# Patient Record
Sex: FEMALE | Race: BLACK OR AFRICAN AMERICAN | NOT HISPANIC OR LATINO | URBAN - METROPOLITAN AREA
[De-identification: names, ages, dates, MRNs, and addresses within clinical notes are randomized per-mention and may not be internally consistent; named-entity substitution may affect disease eponyms.]

---

## 2018-04-13 ENCOUNTER — EMERGENCY (EMERGENCY)
Facility: HOSPITAL | Age: 23
LOS: 0 days | Discharge: HOME | End: 2018-04-13
Attending: EMERGENCY MEDICINE
Payer: COMMERCIAL

## 2018-04-13 VITALS
WEIGHT: 250 LBS | RESPIRATION RATE: 16 BRPM | HEIGHT: 64 IN | DIASTOLIC BLOOD PRESSURE: 70 MMHG | OXYGEN SATURATION: 96 % | HEART RATE: 91 BPM | TEMPERATURE: 99 F | SYSTOLIC BLOOD PRESSURE: 135 MMHG

## 2018-04-13 DIAGNOSIS — M79.671 PAIN IN RIGHT FOOT: ICD-10-CM

## 2018-04-13 DIAGNOSIS — M79.89 OTHER SPECIFIED SOFT TISSUE DISORDERS: ICD-10-CM

## 2018-04-13 PROCEDURE — 73630 X-RAY EXAM OF FOOT: CPT | Mod: 26,RT

## 2018-04-13 PROCEDURE — 99283 EMERGENCY DEPT VISIT LOW MDM: CPT

## 2018-04-13 RX ORDER — IBUPROFEN 200 MG
600 TABLET ORAL ONCE
Qty: 0 | Refills: 0 | Status: DISCONTINUED | OUTPATIENT
Start: 2018-04-13 | End: 2018-04-13

## 2018-04-13 RX ORDER — ACETAMINOPHEN 500 MG
650 TABLET ORAL ONCE
Qty: 0 | Refills: 0 | Status: COMPLETED | OUTPATIENT
Start: 2018-04-13 | End: 2018-04-13

## 2018-04-13 RX ADMIN — Medication 650 MILLIGRAM(S): at 11:59

## 2018-04-13 RX ADMIN — Medication 650 MILLIGRAM(S): at 11:33

## 2018-04-13 NOTE — ED PROVIDER NOTE - PHYSICAL EXAMINATION
Gen: No acute distress, non toxic  Neuro: A&O x 3, moving all 4 extremities  MSK: No spine ttp. ttp genearlized dorsal right foot. ?very slight swelling if any. no calf/ankle swelling/pain.   Skin: No rashes. intact and perfused. No erythema.

## 2018-04-13 NOTE — ED ADULT NURSE NOTE - OBJECTIVE STATEMENT
24 yo female c/o R foot pain x 1.5 weeks and swelling x yesterday. pt ambulated into fast track c no assistance needed, and a steady normal gait. pt took ibuprofen pta.

## 2018-04-13 NOTE — ED PROVIDER NOTE - OBJECTIVE STATEMENT
24 y/o female no pmh c/o atraumatic right dorsal foot pain for ~1 week, with mild swelling over past 2 days. Does not remember any trauma/intox, no joint pains in past, no fever/chills, nausea, uri symptoms. Ambulating but with some dorsal pain when ambulating. No ankle/calf pain. Does not smoke, no hx dvt/pe.   States is not pregnant, taking ibuprofen at home.    ROS: No fever/chills. No eye pain/changes in vision, No ear pain/sore throat/dysphagia, No chest pain/palpitations. No SOB/cough/. No abdominal pain, N/V/D, no black/bloody bm. No dysuria/frequency/discharge, No headache. No Dizziness.    No rashes or breaks in skin. No numbness/tingling/weakness. 22 y/o female no pmh c/o atraumatic right dorsal foot pain for ~1 week, with mild swelling over past 2 days. Does not remember any trauma/intox, no joint pains in past, no fever/chills, nausea, uri symptoms. Ambulating but with some dorsal pain when ambulating. No ankle/calf pain. Does not smoke, no hx dvt/pe.   States is not pregnant, takes ocp's. taking ibuprofen at home.    ROS: No fever/chills. No eye pain/changes in vision, No ear pain/sore throat/dysphagia, No chest pain/palpitations. No SOB/cough/. No abdominal pain, N/V/D, no black/bloody bm. No dysuria/frequency/discharge, No headache. No Dizziness.    No rashes or breaks in skin. No numbness/tingling/weakness.

## 2018-04-13 NOTE — ED PROVIDER NOTE - MEDICAL DECISION MAKING DETAILS
?atraumatic dorsal right foot pain, ambulatory, no significant external abnormalities. xray r/o fracture. ice, supportive care, hard sole shoe, f/u podiatry. no sign septic, dvt (given only in foot), plantar fascitis given dorsal. ?atraumatic dorsal right foot pain, ambulatory, no significant external abnormalities. xray r/o fracture. ice, supportive care, hard sole shoe, f/u podiatry. no sign septic, dvt (given only in foot), gout, plantar fascitis given dorsal. possible microtrauma/sprain.

## 2019-08-30 ENCOUNTER — EMERGENCY (EMERGENCY)
Facility: HOSPITAL | Age: 24
LOS: 1 days | Discharge: HOME | End: 2019-08-30
Admitting: EMERGENCY MEDICINE
Payer: COMMERCIAL

## 2019-08-30 VITALS
RESPIRATION RATE: 16 BRPM | HEART RATE: 70 BPM | SYSTOLIC BLOOD PRESSURE: 144 MMHG | DIASTOLIC BLOOD PRESSURE: 70 MMHG | TEMPERATURE: 98 F | OXYGEN SATURATION: 100 %

## 2019-08-30 VITALS
SYSTOLIC BLOOD PRESSURE: 137 MMHG | TEMPERATURE: 99 F | RESPIRATION RATE: 18 BRPM | HEART RATE: 77 BPM | DIASTOLIC BLOOD PRESSURE: 79 MMHG | OXYGEN SATURATION: 100 %

## 2019-08-30 DIAGNOSIS — N84.1 POLYP OF CERVIX UTERI: ICD-10-CM

## 2019-08-30 LAB
ALBUMIN SERPL ELPH-MCNC: 4.3 G/DL — SIGNIFICANT CHANGE UP (ref 3.3–5)
ALP SERPL-CCNC: 88 U/L — SIGNIFICANT CHANGE UP (ref 40–120)
ALT FLD-CCNC: 34 U/L — HIGH (ref 4–33)
ANION GAP SERPL CALC-SCNC: 15 MMO/L — HIGH (ref 7–14)
AST SERPL-CCNC: 30 U/L — SIGNIFICANT CHANGE UP (ref 4–32)
BASOPHILS # BLD AUTO: 0.04 K/UL — SIGNIFICANT CHANGE UP (ref 0–0.2)
BASOPHILS NFR BLD AUTO: 0.4 % — SIGNIFICANT CHANGE UP (ref 0–2)
BILIRUB SERPL-MCNC: < 0.2 MG/DL — LOW (ref 0.2–1.2)
BUN SERPL-MCNC: 8 MG/DL — SIGNIFICANT CHANGE UP (ref 7–23)
CALCIUM SERPL-MCNC: 9.5 MG/DL — SIGNIFICANT CHANGE UP (ref 8.4–10.5)
CHLORIDE SERPL-SCNC: 103 MMOL/L — SIGNIFICANT CHANGE UP (ref 98–107)
CO2 SERPL-SCNC: 22 MMOL/L — SIGNIFICANT CHANGE UP (ref 22–31)
CREAT SERPL-MCNC: 0.74 MG/DL — SIGNIFICANT CHANGE UP (ref 0.5–1.3)
EOSINOPHIL # BLD AUTO: 0.19 K/UL — SIGNIFICANT CHANGE UP (ref 0–0.5)
EOSINOPHIL NFR BLD AUTO: 1.9 % — SIGNIFICANT CHANGE UP (ref 0–6)
GLUCOSE SERPL-MCNC: 87 MG/DL — SIGNIFICANT CHANGE UP (ref 70–99)
HCG SERPL-ACNC: < 5 MIU/ML — SIGNIFICANT CHANGE UP
HCT VFR BLD CALC: 37.4 % — SIGNIFICANT CHANGE UP (ref 34.5–45)
HGB BLD-MCNC: 11.7 G/DL — SIGNIFICANT CHANGE UP (ref 11.5–15.5)
IMM GRANULOCYTES NFR BLD AUTO: 0.2 % — SIGNIFICANT CHANGE UP (ref 0–1.5)
LYMPHOCYTES # BLD AUTO: 4.18 K/UL — HIGH (ref 1–3.3)
LYMPHOCYTES # BLD AUTO: 41.7 % — SIGNIFICANT CHANGE UP (ref 13–44)
MCHC RBC-ENTMCNC: 29.3 PG — SIGNIFICANT CHANGE UP (ref 27–34)
MCHC RBC-ENTMCNC: 31.3 % — LOW (ref 32–36)
MCV RBC AUTO: 93.5 FL — SIGNIFICANT CHANGE UP (ref 80–100)
MONOCYTES # BLD AUTO: 0.63 K/UL — SIGNIFICANT CHANGE UP (ref 0–0.9)
MONOCYTES NFR BLD AUTO: 6.3 % — SIGNIFICANT CHANGE UP (ref 2–14)
NEUTROPHILS # BLD AUTO: 4.96 K/UL — SIGNIFICANT CHANGE UP (ref 1.8–7.4)
NEUTROPHILS NFR BLD AUTO: 49.5 % — SIGNIFICANT CHANGE UP (ref 43–77)
NRBC # FLD: 0 K/UL — SIGNIFICANT CHANGE UP (ref 0–0)
PLATELET # BLD AUTO: 393 K/UL — SIGNIFICANT CHANGE UP (ref 150–400)
PMV BLD: 10.1 FL — SIGNIFICANT CHANGE UP (ref 7–13)
POTASSIUM SERPL-MCNC: 4.3 MMOL/L — SIGNIFICANT CHANGE UP (ref 3.5–5.3)
POTASSIUM SERPL-SCNC: 4.3 MMOL/L — SIGNIFICANT CHANGE UP (ref 3.5–5.3)
PROT SERPL-MCNC: 7.4 G/DL — SIGNIFICANT CHANGE UP (ref 6–8.3)
RBC # BLD: 4 M/UL — SIGNIFICANT CHANGE UP (ref 3.8–5.2)
RBC # FLD: 12.2 % — SIGNIFICANT CHANGE UP (ref 10.3–14.5)
SODIUM SERPL-SCNC: 140 MMOL/L — SIGNIFICANT CHANGE UP (ref 135–145)
WBC # BLD: 10.02 K/UL — SIGNIFICANT CHANGE UP (ref 3.8–10.5)
WBC # FLD AUTO: 10.02 K/UL — SIGNIFICANT CHANGE UP (ref 3.8–10.5)

## 2019-08-30 PROCEDURE — 99284 EMERGENCY DEPT VISIT MOD MDM: CPT

## 2019-08-30 NOTE — ED ADULT NURSE NOTE - NSIMPLEMENTINTERV_GEN_ALL_ED
Implemented All Universal Safety Interventions:  [place] to call system. Call bell, personal items and telephone within reach. Instruct patient to call for assistance. Room bathroom lighting operational. Non-slip footwear when patient is off stretcher. Physically safe environment: no spills, clutter or unnecessary equipment. Stretcher in lowest position, wheels locked, appropriate side rails in place.

## 2019-08-30 NOTE — ED PROVIDER NOTE - PATIENT PORTAL LINK FT
You can access the FollowMyHealth Patient Portal offered by [facility] by registering at the following website: [email or web address]. By joining [employer]’s FollowMyHealth portal, you will also be able to view your health information using other applications (apps) compatible with our system.

## 2019-08-30 NOTE — ED PROVIDER NOTE - PROGRESS NOTE DETAILS
VIRGINIA Ellis: Spoke with GYN, will see pt in the ED VIRGINIA Ellis: Pt seen by GYN, she has small cervical polyp, pt had already spoke with her GYN  and has an appointment with him on Tuesday 9/03 per GYN. Discussed with pt that her blood levels are normal, she will return with any worsening or concerning symptoms

## 2019-08-30 NOTE — ED ADULT NURSE NOTE - OBJECTIVE STATEMENT
PT received into intake A&Ox4, ambulatory C/O vaginal bleeding x 1 day. Pt states was having intercourse yesterday, after intercourse noticed heavy bleeding with clots. States Bleeding is mild today. Denies CP, SOB, palpitations, weakness/ fatigue. Md evaluated, VS as noted. 20 G IV placed to R AC, labs sent. Call bell within reach, comfort measures provided, family at bedside, will continue to monitor for safety and comfort.

## 2019-08-30 NOTE — ED ADULT TRIAGE NOTE - CHIEF COMPLAINT QUOTE
patient reports vaginal bleeding that is 1.5 weeks after her period ended. reports bleeding is mild with some clots. unsure if she is pregnant

## 2019-08-30 NOTE — CONSULT NOTE ADULT - SUBJECTIVE AND OBJECTIVE BOX
JOSH BUSH  24y  Female [id number]    HPI:  23 y/o G0 presents to ED after bleeding of one day duration. Patient states she was having intercourse yesterday and felt something pop and started bleeding. She only filled up minimal pads. Denies CP/SOB/ fever/chills/dizziness/lightheadedness. Patient is currently sexually active with Boyfriend, does not use protection, says is in a monogamous relationship. Would desire pregnancy.         Name of GYN Physician: Dr. Sandoval    POB:  Denies    Pgyn: Denies fibroids, cysts, endometriosis, STI's, denies. Abnormal pap smears     Home meds: None (Spironolactone, OCPs d/c)    Hospital Meds:   MEDICATIONS  (STANDING):    MEDICATIONS  (PRN):      Allergies    penicillin (Anaphylaxis)    Intolerances        PAST MEDICAL & SURGICAL HISTORY:  No pertinent past medical history  No significant past surgical history      FAMILY HISTORY:  No pertinent family history in first degree relatives      Social History:  Denies smoking use, drug use, alcohol use.     Vital Signs Last 24 Hrs  T(C): 36.9 (30 Aug 2019 21:33), Max: 37.2 (30 Aug 2019 19:08)  T(F): 98.4 (30 Aug 2019 21:33), Max: 99 (30 Aug 2019 19:08)  HR: 70 (30 Aug 2019 21:33) (70 - 77)  BP: 144/70 (30 Aug 2019 21:33) (137/79 - 144/70)  BP(mean): --  RR: 16 (30 Aug 2019 21:33) (16 - 18)  SpO2: 100% (30 Aug 2019 21:33) (100% - 100%)    Physical Exam:   General: sitting comfortably in bed, NAD   CV: RR   Lungs: Unlabored  Abd: Soft, non-tender, non-distended.   :  Mild bleeding on pad.    External labia wnl.  Bimanual exam with no cervical motion tenderness, uterus wnl, adnexa non palpable b/l.  Cervix closed   Speculum Exam: small polyp at 12o clock on cervix   Ext: non-tender b/l, no edema     LABS:                              11.7   10.02 )-----------( 393      ( 30 Aug 2019 21:30 )             37.4     08-30    140  |  103  |  8   ----------------------------<  87  4.3   |  22  |  0.74    Ca    9.5      30 Aug 2019 21:30    TPro  7.4  /  Alb  4.3  /  TBili  < 0.2<L>  /  DBili  x   /  AST  30  /  ALT  34<H>  /  AlkPhos  88  08-30    I&O's Detail

## 2019-08-30 NOTE — ED PROVIDER NOTE - CLINICAL SUMMARY MEDICAL DECISION MAKING FREE TEXT BOX
24yF w/pmhx PCOS p/w vaginal bleeding w/ cervical polyp on exam. Will check cbc/cmp/hcg, GYN consult

## 2019-08-30 NOTE — ED PROVIDER NOTE - PHYSICAL EXAMINATION
: chaperone PA Espitia  +vaginal/cervical polyp with mild active bleeding, non-tender, no cervical bleeding, no CMT, no adnexal tenderness

## 2019-08-30 NOTE — ED PROVIDER NOTE - OBJECTIVE STATEMENT
24yF w/pmhx PCOS (not on OCPs) p/w vaginal bleeding. Pt states her LMP was 8/18 but yesterday during intercourse she developed vaginal bleeding described as "something popped". Pt states the bleeding had subsided but began again today. Pt reports she saw something that looks like a polyp in the vagina and is concerned. Pt denies vaginal or pelvic pain, abd pain, n/v/d, fever/chills, weakness, cp, sob or any other concerns.  Pts GYN , pt scheduled to see them next week

## 2019-08-30 NOTE — CONSULT NOTE ADULT - PROBLEM SELECTOR RECOMMENDATION 9
-Nothing acute to do at this time  -Outpatient follow up with Dr. Sandoval appointment on tuesday    CIRO Quinn PGY2

## 2019-08-30 NOTE — ED PROVIDER NOTE - NSFOLLOWUPINSTRUCTIONS_ED_ALL_ED_FT
Follow up with your GYN on 9/03, you have an appointment scheduled  Follow up with your primary care provider within 1 week  Return to the ER with any worsening or concerning symptoms, increased bleeding, weakness, shortness of breath, vaginal pain or any other concerns.

## 2019-08-30 NOTE — ED PROVIDER NOTE - PMH
No pertinent past medical history No pertinent past medical history    PCOS (polycystic ovarian syndrome)

## 2019-09-02 LAB
C TRACH RRNA SPEC QL NAA+PROBE: SIGNIFICANT CHANGE UP
N GONORRHOEA RRNA SPEC QL NAA+PROBE: SIGNIFICANT CHANGE UP
SPECIMEN SOURCE: SIGNIFICANT CHANGE UP

## 2019-09-13 ENCOUNTER — TRANSCRIPTION ENCOUNTER (OUTPATIENT)
Age: 24
End: 2019-09-13

## 2019-12-03 PROBLEM — E28.2 POLYCYSTIC OVARIAN SYNDROME: Chronic | Status: ACTIVE | Noted: 2019-08-31

## 2019-12-13 ENCOUNTER — APPOINTMENT (OUTPATIENT)
Dept: SURGERY | Facility: CLINIC | Age: 24
End: 2019-12-13
Payer: COMMERCIAL

## 2019-12-13 VITALS
RESPIRATION RATE: 14 BRPM | HEIGHT: 64 IN | WEIGHT: 272 LBS | BODY MASS INDEX: 46.44 KG/M2 | TEMPERATURE: 97.2 F | SYSTOLIC BLOOD PRESSURE: 115 MMHG | HEART RATE: 68 BPM | DIASTOLIC BLOOD PRESSURE: 80 MMHG

## 2019-12-13 DIAGNOSIS — E28.2 POLYCYSTIC OVARIAN SYNDROME: ICD-10-CM

## 2019-12-13 DIAGNOSIS — E78.00 PURE HYPERCHOLESTEROLEMIA, UNSPECIFIED: ICD-10-CM

## 2019-12-13 DIAGNOSIS — M54.9 DORSALGIA, UNSPECIFIED: ICD-10-CM

## 2019-12-13 PROCEDURE — 99205 OFFICE O/P NEW HI 60 MIN: CPT

## 2019-12-13 NOTE — CONSULT LETTER
[Dear  ___] : Dear  [unfilled], [Consult Letter:] : I had the pleasure of evaluating your patient, [unfilled]. [Please see my note below.] : Please see my note below. [Consult Closing:] : Thank you very much for allowing me to participate in the care of this patient.  If you have any questions, please do not hesitate to contact me. [Sincerely,] : Sincerely, [FreeTextEntry3] : Diaz Ramsay MD, FACS, FASMBS\par Chief Division of Minimally Invasive Surgery\par Co-Director of Bariatric Surgery \par [facility]\[place] [address]

## 2019-12-13 NOTE — REASON FOR VISIT
[Morbid Obesity (BMI>40)] : morbid obesity (bmi>40) [FreeTextEntry2] : referred by Dr. Janett Suarez chief complaint morbid obesity

## 2019-12-13 NOTE — ASSESSMENT
[FreeTextEntry1] : 24-year-old female 5 foot 4 272 pounds with a BMI of 46.7 she would like to be considered for a sleeve gastrectomy I believe she would be an excellent candidate she will undergo her usual medical nutritional and psychological workup attempt a preoperative support group meeting and return for a second office visit one toe was completed the risks benefits and expectations were discussed at length with the patient all of her questions were answered

## 2020-01-02 DIAGNOSIS — R16.0 HEPATOMEGALY, NOT ELSEWHERE CLASSIFIED: ICD-10-CM

## 2020-01-10 PROBLEM — R16.0 HEPATOMEGALY: Status: ACTIVE | Noted: 2020-01-10

## 2020-03-02 DIAGNOSIS — E55.9 VITAMIN D DEFICIENCY, UNSPECIFIED: ICD-10-CM

## 2020-03-02 LAB
25(OH)D3 SERPL-MCNC: 9.3 NG/ML
ALBUMIN SERPL ELPH-MCNC: 4.4 G/DL
ALP BLD-CCNC: 88 U/L
ALT SERPL-CCNC: 38 U/L
ANION GAP SERPL CALC-SCNC: 14 MMOL/L
APTT BLD: 38 SEC
AST SERPL-CCNC: 24 U/L
BASOPHILS # BLD AUTO: 0.04 K/UL
BASOPHILS NFR BLD AUTO: 0.6 %
BILIRUB SERPL-MCNC: 0.3 MG/DL
BUN SERPL-MCNC: 8 MG/DL
CALCIUM SERPL-MCNC: 9.3 MG/DL
CHLORIDE SERPL-SCNC: 103 MMOL/L
CO2 SERPL-SCNC: 22 MMOL/L
CREAT SERPL-MCNC: 0.76 MG/DL
EOSINOPHIL # BLD AUTO: 0.11 K/UL
EOSINOPHIL NFR BLD AUTO: 1.8 %
FOLATE SERPL-MCNC: 7.4 NG/ML
GLUCOSE SERPL-MCNC: 88 MG/DL
HCG SERPL QL: NEGATIVE
HCT VFR BLD CALC: 40.8 %
HGB BLD-MCNC: 12.7 G/DL
IMM GRANULOCYTES NFR BLD AUTO: 0.2 %
INR PPP: 1.08 RATIO
IRON SERPL-MCNC: 67 UG/DL
LYMPHOCYTES # BLD AUTO: 2.66 K/UL
LYMPHOCYTES NFR BLD AUTO: 43 %
MAN DIFF?: NORMAL
MCHC RBC-ENTMCNC: 30.2 PG
MCHC RBC-ENTMCNC: 31.1 GM/DL
MCV RBC AUTO: 97.1 FL
MONOCYTES # BLD AUTO: 0.36 K/UL
MONOCYTES NFR BLD AUTO: 5.8 %
NEUTROPHILS # BLD AUTO: 3.01 K/UL
NEUTROPHILS NFR BLD AUTO: 48.6 %
PAPP-A SERPL-ACNC: <1 MIU/ML
PLATELET # BLD AUTO: 385 K/UL
POTASSIUM SERPL-SCNC: 4.2 MMOL/L
PROT SERPL-MCNC: 6.9 G/DL
PT BLD: 12.5 SEC
RBC # BLD: 4.2 M/UL
RBC # FLD: 12.2 %
SODIUM SERPL-SCNC: 140 MMOL/L
VIT B12 SERPL-MCNC: 399 PG/ML
WBC # FLD AUTO: 6.19 K/UL

## 2020-03-04 LAB — VIT B1 SERPL-MCNC: 104.5 NMOL/L

## 2020-03-06 ENCOUNTER — APPOINTMENT (OUTPATIENT)
Dept: SURGERY | Facility: CLINIC | Age: 25
End: 2020-03-06
Payer: COMMERCIAL

## 2020-03-06 VITALS
OXYGEN SATURATION: 99 % | SYSTOLIC BLOOD PRESSURE: 133 MMHG | HEART RATE: 72 BPM | DIASTOLIC BLOOD PRESSURE: 74 MMHG | RESPIRATION RATE: 18 BRPM

## 2020-03-06 PROCEDURE — 99214 OFFICE O/P EST MOD 30 MIN: CPT

## 2020-03-13 ENCOUNTER — OUTPATIENT (OUTPATIENT)
Dept: OUTPATIENT SERVICES | Facility: HOSPITAL | Age: 25
LOS: 1 days | End: 2020-03-13
Payer: COMMERCIAL

## 2020-03-13 VITALS
HEIGHT: 65 IN | OXYGEN SATURATION: 98 % | SYSTOLIC BLOOD PRESSURE: 110 MMHG | DIASTOLIC BLOOD PRESSURE: 73 MMHG | HEART RATE: 70 BPM | TEMPERATURE: 98 F | WEIGHT: 259.04 LBS | RESPIRATION RATE: 18 BRPM

## 2020-03-13 DIAGNOSIS — Z29.9 ENCOUNTER FOR PROPHYLACTIC MEASURES, UNSPECIFIED: ICD-10-CM

## 2020-03-13 DIAGNOSIS — E66.01 MORBID (SEVERE) OBESITY DUE TO EXCESS CALORIES: ICD-10-CM

## 2020-03-13 DIAGNOSIS — G47.33 OBSTRUCTIVE SLEEP APNEA (ADULT) (PEDIATRIC): ICD-10-CM

## 2020-03-13 DIAGNOSIS — Z01.818 ENCOUNTER FOR OTHER PREPROCEDURAL EXAMINATION: ICD-10-CM

## 2020-03-13 LAB
ALBUMIN SERPL ELPH-MCNC: 4.5 G/DL — SIGNIFICANT CHANGE UP (ref 3.3–5)
ALP SERPL-CCNC: 91 U/L — SIGNIFICANT CHANGE UP (ref 40–120)
ALT FLD-CCNC: 39 U/L — SIGNIFICANT CHANGE UP (ref 10–45)
ANION GAP SERPL CALC-SCNC: 14 MMOL/L — SIGNIFICANT CHANGE UP (ref 5–17)
AST SERPL-CCNC: 23 U/L — SIGNIFICANT CHANGE UP (ref 10–40)
BILIRUB SERPL-MCNC: 0.2 MG/DL — SIGNIFICANT CHANGE UP (ref 0.2–1.2)
BLD GP AB SCN SERPL QL: NEGATIVE — SIGNIFICANT CHANGE UP
BUN SERPL-MCNC: 8 MG/DL — SIGNIFICANT CHANGE UP (ref 7–23)
CALCIUM SERPL-MCNC: 9.8 MG/DL — SIGNIFICANT CHANGE UP (ref 8.4–10.5)
CHLORIDE SERPL-SCNC: 102 MMOL/L — SIGNIFICANT CHANGE UP (ref 96–108)
CO2 SERPL-SCNC: 23 MMOL/L — SIGNIFICANT CHANGE UP (ref 22–31)
CREAT SERPL-MCNC: 0.74 MG/DL — SIGNIFICANT CHANGE UP (ref 0.5–1.3)
GLUCOSE SERPL-MCNC: 89 MG/DL — SIGNIFICANT CHANGE UP (ref 70–99)
HBA1C BLD-MCNC: 5.2 % — SIGNIFICANT CHANGE UP (ref 4–5.6)
HCT VFR BLD CALC: 39.7 % — SIGNIFICANT CHANGE UP (ref 34.5–45)
HGB BLD-MCNC: 12.8 G/DL — SIGNIFICANT CHANGE UP (ref 11.5–15.5)
MCHC RBC-ENTMCNC: 30.2 PG — SIGNIFICANT CHANGE UP (ref 27–34)
MCHC RBC-ENTMCNC: 32.2 GM/DL — SIGNIFICANT CHANGE UP (ref 32–36)
MCV RBC AUTO: 93.6 FL — SIGNIFICANT CHANGE UP (ref 80–100)
NRBC # BLD: 0 /100 WBCS — SIGNIFICANT CHANGE UP (ref 0–0)
PLATELET # BLD AUTO: 450 K/UL — HIGH (ref 150–400)
POTASSIUM SERPL-MCNC: 3.8 MMOL/L — SIGNIFICANT CHANGE UP (ref 3.5–5.3)
POTASSIUM SERPL-SCNC: 3.8 MMOL/L — SIGNIFICANT CHANGE UP (ref 3.5–5.3)
PROT SERPL-MCNC: 8.1 G/DL — SIGNIFICANT CHANGE UP (ref 6–8.3)
RBC # BLD: 4.24 M/UL — SIGNIFICANT CHANGE UP (ref 3.8–5.2)
RBC # FLD: 11.8 % — SIGNIFICANT CHANGE UP (ref 10.3–14.5)
RH IG SCN BLD-IMP: POSITIVE — SIGNIFICANT CHANGE UP
SODIUM SERPL-SCNC: 139 MMOL/L — SIGNIFICANT CHANGE UP (ref 135–145)
WBC # BLD: 5.78 K/UL — SIGNIFICANT CHANGE UP (ref 3.8–10.5)
WBC # FLD AUTO: 5.78 K/UL — SIGNIFICANT CHANGE UP (ref 3.8–10.5)

## 2020-03-13 PROCEDURE — 86901 BLOOD TYPING SEROLOGIC RH(D): CPT

## 2020-03-13 PROCEDURE — 83036 HEMOGLOBIN GLYCOSYLATED A1C: CPT

## 2020-03-13 PROCEDURE — 85027 COMPLETE CBC AUTOMATED: CPT

## 2020-03-13 PROCEDURE — 86900 BLOOD TYPING SEROLOGIC ABO: CPT

## 2020-03-13 PROCEDURE — 80053 COMPREHEN METABOLIC PANEL: CPT

## 2020-03-13 PROCEDURE — G0463: CPT

## 2020-03-13 PROCEDURE — 86850 RBC ANTIBODY SCREEN: CPT

## 2020-03-13 RX ORDER — SPIRONOLACTONE 25 MG/1
0 TABLET, FILM COATED ORAL
Qty: 0 | Refills: 0 | DISCHARGE

## 2020-03-13 RX ORDER — IBUPROFEN 200 MG
1 TABLET ORAL
Qty: 0 | Refills: 0 | DISCHARGE

## 2020-03-13 NOTE — H&P PST ADULT - ENERGY EXPENDITURE (METS)
8.50 DASI METS 73F hx of Asthma, Hypertension presents to [facility] ED c/o 3 days of wheezing, shortness of breath and productive cough with white sputum. Patient says her son has been ill, afflicted with similar symptoms. The dyspnea and wheezing occurred concomitantly and have been fairly consistent despite the use of rescue inhalers around the clock. She then developed productive cough and myalgias with subjective fevers. No travel history. She has had 1 instance of asthma exacerbation that led her to be intubated years ago.     Treated with Tamiflu, duonebs, symbicort and oxygen. 73F hx of Asthma, Hypertension presents to [facility] ED c/o 3 days of wheezing, shortness of breath and productive cough with white sputum. Patient says her son has been ill, afflicted with similar symptoms. The dyspnea and wheezing occurred concomitantly and have been fairly consistent despite the use of rescue inhalers around the clock. She then developed productive cough and myalgias with subjective fevers. No travel history. She has had 1 instance of asthma exacerbation that led her to be intubated years ago.     Treated with Tamiflu, duonebs, symbicort and oxygen.    PT recs no needs. 73F hx of Asthma, Hypertension presents to [facility] ED c/o 3 days of wheezing, shortness of breath and productive cough with white sputum. Patient says her son has been ill, afflicted with similar symptoms. The dyspnea and wheezing occurred concomitantly and have been fairly consistent despite the use of rescue inhalers around the clock. She then developed productive cough and myalgias with subjective fevers. No travel history. She has had 1 instance of asthma exacerbation that led her to be intubated years ago.     Treated with Tamiflu, duonebs, symbicort and oxygen. Pt continues to have loose cough/congestion with noted hypoxia on ambulation.  Pt however is insistent to go home and son is in agreement to have her home.  02 is ordered for patient and she is aware to use it.  Prednisone is a slow taper over next 12 days and pt will finish course of tamiflu. Pt will f.u with her own pulmonologist at her request in one week   Dispo home with 02     PT recs no needs. 73F hx of Asthma, Hypertension presents to [facility] ED c/o 3 days of wheezing, shortness of breath and productive cough with white sputum. Patient says her son has been ill, afflicted with similar symptoms. The dyspnea and wheezing occurred concomitantly and have been fairly consistent despite the use of rescue inhalers around the clock. She then developed productive cough and myalgias with subjective fevers. No travel history. She has had 1 instance of asthma exacerbation that led her to be intubated years ago.     Treated with Tamiflu, duonebs, symbicort and oxygen. Pt continues to have loose cough/congestion with noted hypoxia on ambulation.  Pt however is insistent to go home and son is in agreement to have her home.  O2 is ordered for patient and she is aware to use it.  Prednisone is a slow taper over next 12 days and pt will finish course of tamiflu. Pt will f.u with her own pulmonologist at her request in one week   Dispo home with O2     PT recs no needs.

## 2020-03-13 NOTE — H&P PST ADULT - ASSESSMENT
morbid severe obesity due to excess calories  GARRETTI VTE 2.0 SCORE [CLOT updated 2019]    AGE RELATED RISK FACTORS                                                       MOBILITY RELATED FACTORS  [ ] Age 41-60 years                                            (1 Point)                    [ ] Bed rest                                                        (1 Point)  [ ] Age: 61-74 years                                           (2 Points)                  [ ] Plaster cast                                                   (2 Points)  [ ] Age= 75 years                                              (3 Points)                    [ ] Bed bound for more than 72 hours                 (2 Points)    DISEASE RELATED RISK FACTORS                                               GENDER SPECIFIC FACTORS  [ ] Edema in the lower extremities                       (1 Point)              [ ] Pregnancy                                                     (1 Point)  [ ] Varicose veins                                               (1 Point)                     [ ] Post-partum < 6 weeks                                   (1 Point)             [x ] BMI > 25 Kg/m2                                            (1 Point)                     [ ] Hormonal therapy  or oral contraception          (1 Point)                 [ ] Sepsis (in the previous month)                        (1 Point)               [ ] History of pregnancy complications                 (1 point)  [ ] Pneumonia or serious lung disease                                               [ ] Unexplained or recurrent                     (1 Point)           (in the previous month)                               (1 Point)  [ ] Abnormal pulmonary function test                     (1 Point)                 SURGERY RELATED RISK FACTORS  [ ] Acute myocardial infarction                              (1 Point)               [ ]  Section                                             (1 Point)  [ ] Congestive heart failure (in the previous month)  (1 Point)      [ ] Minor surgery                                                  (1 Point)   [ ] Inflammatory bowel disease                             (1 Point)               [ ] Arthroscopic surgery                                        (2 Points)  [ ] Central venous access                                      (2 Points)                [x ] General surgery lasting more than 45 minutes (2 points)  [ ] Malignancy- Present or previous                   (2 Points)                [ ] Elective arthroplasty                                         (5 points)    [ ] Stroke (in the previous month)                          (5 Points)                                                                                                                                                           HEMATOLOGY RELATED FACTORS                                                 TRAUMA RELATED RISK FACTORS  [ ] Prior episodes of VTE                                     (3 Points)                [ ] Fracture of the hip, pelvis, or leg                       (5 Points)  [ ] Positive family history for VTE                         (3 Points)             [ ] Acute spinal cord injury (in the previous month)  (5 Points)  [ ] Prothrombin 01183 A                                     (3 Points)               [ ] Paralysis  (less than 1 month)                             (5 Points)  [ ] Factor V Leiden                                             (3 Points)                  [ ] Multiple Trauma within 1 month                        (5 Points)  [ ] Lupus anticoagulants                                     (3 Points)                                                           [ ] Anticardiolipin antibodies                               (3 Points)                                                       [ ] High homocysteine in the blood                      (3 Points)                                             [ ] Other congenital or acquired thrombophilia      (3 Points)                                                [ ] Heparin induced thrombocytopenia                  (3 Points)                                     Total Score [      3    ]

## 2020-03-13 NOTE — H&P PST ADULT - HISTORY OF PRESENT ILLNESS
This is a 25 y/o female with morbid obesity, unable to loose weight by diet or exercise, she presents today for  laparoscopic vertical sleeve gastrectomy

## 2020-03-13 NOTE — H&P PST ADULT - NSICDXPROBLEM_GEN_ALL_CORE_FT
PROBLEM DIAGNOSES  Problem: Mild obstructive sleep apnea  Assessment and Plan: DORA precaution, notified OR booking    Problem: Morbid (severe) obesity due to excess calories  Assessment and Plan: laparoscopic vertical sleeve gastrectomy    Problem: Need for prophylactic measure  Assessment and Plan: The Caprini score indicates this patient is at risk for a VTE event (score 3-5).  Most surgical patients in this group would benefit from pharmacologic prophylaxis.  The surgical team will determine the balance between VTE risk and bleeding risk

## 2020-03-13 NOTE — H&P PST ADULT - NSICDXPASTMEDICALHX_GEN_ALL_CORE_FT
PAST MEDICAL HISTORY:  Mild obstructive sleep apnea     Morbid obesity     PCOS (polycystic ovarian syndrome)

## 2020-05-06 ENCOUNTER — APPOINTMENT (OUTPATIENT)
Dept: SURGERY | Facility: HOSPITAL | Age: 25
End: 2020-05-06

## 2020-06-02 PROBLEM — E66.01 MORBID (SEVERE) OBESITY DUE TO EXCESS CALORIES: Chronic | Status: ACTIVE | Noted: 2020-03-13

## 2020-06-02 PROBLEM — G47.33 OBSTRUCTIVE SLEEP APNEA (ADULT) (PEDIATRIC): Chronic | Status: ACTIVE | Noted: 2020-03-13

## 2020-06-12 ENCOUNTER — OUTPATIENT (OUTPATIENT)
Dept: OUTPATIENT SERVICES | Facility: HOSPITAL | Age: 25
LOS: 1 days | End: 2020-06-12
Payer: COMMERCIAL

## 2020-06-12 VITALS
HEIGHT: 64 IN | HEART RATE: 68 BPM | DIASTOLIC BLOOD PRESSURE: 82 MMHG | TEMPERATURE: 98 F | SYSTOLIC BLOOD PRESSURE: 121 MMHG | WEIGHT: 260.37 LBS | OXYGEN SATURATION: 100 % | RESPIRATION RATE: 18 BRPM

## 2020-06-12 DIAGNOSIS — G47.33 OBSTRUCTIVE SLEEP APNEA (ADULT) (PEDIATRIC): ICD-10-CM

## 2020-06-12 DIAGNOSIS — Z01.818 ENCOUNTER FOR OTHER PREPROCEDURAL EXAMINATION: ICD-10-CM

## 2020-06-12 DIAGNOSIS — Z98.890 OTHER SPECIFIED POSTPROCEDURAL STATES: Chronic | ICD-10-CM

## 2020-06-12 DIAGNOSIS — E66.01 MORBID (SEVERE) OBESITY DUE TO EXCESS CALORIES: ICD-10-CM

## 2020-06-12 DIAGNOSIS — Z29.9 ENCOUNTER FOR PROPHYLACTIC MEASURES, UNSPECIFIED: ICD-10-CM

## 2020-06-12 LAB
A1C WITH ESTIMATED AVERAGE GLUCOSE RESULT: 5.3 % — SIGNIFICANT CHANGE UP (ref 4–5.6)
ALBUMIN SERPL ELPH-MCNC: 4.6 G/DL — SIGNIFICANT CHANGE UP (ref 3.3–5)
ALP SERPL-CCNC: 87 U/L — SIGNIFICANT CHANGE UP (ref 40–120)
ALT FLD-CCNC: 50 U/L — HIGH (ref 10–45)
ANION GAP SERPL CALC-SCNC: 13 MMOL/L — SIGNIFICANT CHANGE UP (ref 5–17)
AST SERPL-CCNC: 33 U/L — SIGNIFICANT CHANGE UP (ref 10–40)
BILIRUB SERPL-MCNC: 0.3 MG/DL — SIGNIFICANT CHANGE UP (ref 0.2–1.2)
BLD GP AB SCN SERPL QL: NEGATIVE — SIGNIFICANT CHANGE UP
BUN SERPL-MCNC: 8 MG/DL — SIGNIFICANT CHANGE UP (ref 7–23)
CALCIUM SERPL-MCNC: 9.4 MG/DL — SIGNIFICANT CHANGE UP (ref 8.4–10.5)
CHLORIDE SERPL-SCNC: 102 MMOL/L — SIGNIFICANT CHANGE UP (ref 96–108)
CO2 SERPL-SCNC: 22 MMOL/L — SIGNIFICANT CHANGE UP (ref 22–31)
CREAT SERPL-MCNC: 0.67 MG/DL — SIGNIFICANT CHANGE UP (ref 0.5–1.3)
ESTIMATED AVERAGE GLUCOSE: 105 MG/DL — SIGNIFICANT CHANGE UP (ref 68–114)
GLUCOSE SERPL-MCNC: 89 MG/DL — SIGNIFICANT CHANGE UP (ref 70–99)
HCT VFR BLD CALC: 39.9 % — SIGNIFICANT CHANGE UP (ref 34.5–45)
HGB BLD-MCNC: 12.6 G/DL — SIGNIFICANT CHANGE UP (ref 11.5–15.5)
MCHC RBC-ENTMCNC: 29.9 PG — SIGNIFICANT CHANGE UP (ref 27–34)
MCHC RBC-ENTMCNC: 31.6 GM/DL — LOW (ref 32–36)
MCV RBC AUTO: 94.5 FL — SIGNIFICANT CHANGE UP (ref 80–100)
NRBC # BLD: 0 /100 WBCS — SIGNIFICANT CHANGE UP (ref 0–0)
PLATELET # BLD AUTO: 423 K/UL — HIGH (ref 150–400)
POTASSIUM SERPL-MCNC: 4 MMOL/L — SIGNIFICANT CHANGE UP (ref 3.5–5.3)
POTASSIUM SERPL-SCNC: 4 MMOL/L — SIGNIFICANT CHANGE UP (ref 3.5–5.3)
PROT SERPL-MCNC: 8 G/DL — SIGNIFICANT CHANGE UP (ref 6–8.3)
RBC # BLD: 4.22 M/UL — SIGNIFICANT CHANGE UP (ref 3.8–5.2)
RBC # FLD: 12.1 % — SIGNIFICANT CHANGE UP (ref 10.3–14.5)
RH IG SCN BLD-IMP: POSITIVE — SIGNIFICANT CHANGE UP
SODIUM SERPL-SCNC: 137 MMOL/L — SIGNIFICANT CHANGE UP (ref 135–145)
WBC # BLD: 5.37 K/UL — SIGNIFICANT CHANGE UP (ref 3.8–10.5)
WBC # FLD AUTO: 5.37 K/UL — SIGNIFICANT CHANGE UP (ref 3.8–10.5)

## 2020-06-12 PROCEDURE — 86900 BLOOD TYPING SEROLOGIC ABO: CPT

## 2020-06-12 PROCEDURE — 86901 BLOOD TYPING SEROLOGIC RH(D): CPT

## 2020-06-12 PROCEDURE — G0463: CPT

## 2020-06-12 PROCEDURE — 80053 COMPREHEN METABOLIC PANEL: CPT

## 2020-06-12 PROCEDURE — 85027 COMPLETE CBC AUTOMATED: CPT

## 2020-06-12 PROCEDURE — 86850 RBC ANTIBODY SCREEN: CPT

## 2020-06-12 PROCEDURE — 83036 HEMOGLOBIN GLYCOSYLATED A1C: CPT

## 2020-06-12 RX ORDER — VANCOMYCIN HCL 1 G
1500 VIAL (EA) INTRAVENOUS ONCE
Refills: 0 | Status: COMPLETED | OUTPATIENT
Start: 2020-06-24 | End: 2020-06-24

## 2020-06-12 NOTE — H&P PST ADULT - NSICDXPROBLEM_GEN_ALL_CORE_FT
PROBLEM DIAGNOSES  Problem: Morbid obesity  Assessment and Plan: scheduled for laparoscopic vertical sleeve gastrectomy  preop instruction discussed, verbalized understanding  blood draw for cbc, cmp, A1c, t&s  chlorhexidine wash given, and urine cup for urine sample  fingerstick on admit     Problem: Obstructive sleep apnea  Assessment and Plan: OR booking notified    Problem: Need for prophylactic measure  Assessment and Plan: The Caprini score indicates this patient is at risk for a VTE event (score 3-5).  Most surgical patients in this group would benefit from pharmacologic prophylaxis.  The surgical team will determine the balance between VTE risk and bleeding risk

## 2020-06-12 NOTE — H&P PST ADULT - HISTORY OF PRESENT ILLNESS
This is a 26 y/o female with morbid obesity, unable to loose weight by diet or exercise, she presents today for  laparoscopic vertical sleeve gastrectomy, scheduled for 6/24/2020.    Covid-19 test scheduled for 6/21/2020 This is a 24 y/o female with morbid obesity, unable to loose weight by diet or exercise, she presents today for  laparoscopic vertical sleeve gastrectomy, scheduled for 6/24/2020. Originally scheduled in March and cancelled due to Covid-19 pandemic.    Covid-19 test scheduled for 6/21/2020

## 2020-06-12 NOTE — H&P PST ADULT - NS ABD PE RECTAL EXAM
PHYSICAL THERAPY - DAILY TREATMENT NOTE    Patient Name: Ehsan Daniel        Date: 2018  : 1991   yes Patient  Verified  Visit #:   2   of   8  Insurance: Payor: SELF PAY / Plan: [facility] SELF PAY / Product Type: Self Pay /      In time: 4:30 Out time: 5:32   Total Treatment Time: 62     Medicare/[employer] Time Tracking (below)   Total Timed Codes (min):  NA 1:1 Treatment Time:  NA     TREATMENT AREA =  Low back pain [M54.5]    SUBJECTIVE  Pain Level (on 0 to 10 scale):  8  / 10   Medication Changes/New allergies or changes in medical history, any new surgeries or procedures?    no  If yes, update Summary List   Subjective Functional Status/Changes:  []  No changes reported     Patient reports increase low back pain due to prolonged sitting (>45 minutes) at work; walked around her office for a few minutes and noted minimal decrease low back pain.  \"It's right in the middle of my low back\"       OBJECTIVE  Modalities Rationale:     decrease inflammation and decrease pain to improve patient's ability to perform transfers and prolonged sitting/standing activities    min [] Estim, type/location:                                      []  att     []  unatt     []  w/US     []  w/ice    []  w/heat    min []  Mechanical Traction: type/lbs                   []  pro   []  sup   []  int   []  cont    []  before manual    []  after manual    min []  Ultrasound, settings/location:      min []  Iontophoresis w/ dexamethasone, location:                                               []  take home patch       []  in clinic   10 min [x]  Ice     []  Heat    location/position: Supine with wedge to L/S post-session    min []  Vasopneumatic Device, press/temp:     min []  Other:    [] Skin assessment post-treatment (if applicable):    []  intact    []  redness- no adverse reaction     []redness  adverse reaction:        42 min Therapeutic Exercise:  [x]  See flow sheet   Rationale:      increase ROM and increase strength to improve the patients ability to perform pain-free sitting/standing activities    10 min Manual Therapy: STM to L L/S paraspinals, QL, glute med/piriformis in R sidelying; SI check - even   Rationale:      decrease pain, increase ROM and decrease trigger points to improve patient's ability to return to pain-free sitting during work activities. min Therapeutic Activity:    Rationale:         min Neuromuscular Re-ed:    Rationale:      min Gait Training:    Rationale:         min Patient Education:  yes  Reviewed HEP   []  Progressed/Changed HEP based on: Other Objective/Functional Measures:    TE per flowsheet  Presented with significant tone and tenderness along L/S paraspinals, QL, and glute med during manual therapy; noted wincing upon palpation  Demonstrated moderate L/S extension during all exercises; noted increase pain/discomfort in low back when attempting to perform exercises in neutral spine. Post Treatment Pain Level (on 0 to 10) scale:   4  / 10     ASSESSMENT  Assessment/Changes in Function:     Able to perform exercises (see flowsheet), however req'd encouragement and education throughout PT session to perform exercises. Educated patient on gentle ms activation and ROM to avoid increase LBP; patient responded with understanding. Denies increase sharp pain or red flags during exercises. Discussed and educated patient on performing HEP with gentle ms activation/ROM then utilizing newly purchased large ice pack to decrease c/o LBP; patient responded with good understanding. Due to patient's initial subjective pain, held on performing standing exercises.  Noted decrease pain following utilization of ice to L/S.      []  See Progress Note/Recertification   Patient will continue to benefit from skilled PT services to modify and progress therapeutic interventions, address functional mobility deficits, address ROM deficits, address strength deficits, analyze and address soft tissue restrictions, analyze and cue movement patterns, analyze and modify body mechanics/ergonomics and assess and modify postural abnormalities to attain remaining goals. Progress toward goals / Updated goals:     Will attempt to perform standing therex NV per patient's tolerance  Progressing towards STG #1       PLAN  []  Upgrade activities as tolerated yes Continue plan of care   []  Discharge due to :    []  Other:      Therapist: ONEL Gonzalez    Date: 8/21/2018 Time: 5:57 PM     Future Appointments  Date Time Provider Raquel Conteh   8/21/2018 4:30 PM Eliecer Pour [facility]   8/23/2018 3:00 PM Eliecer Pour [facility]   8/28/2018 3:30 PM Eliecer Pour [facility]   8/30/2018 4:30 PM Eliecer Pour [facility]   9/4/2018 3:30 PM Ania Lawler, PT [facility]   9/6/2018 3:30 PM Eliecer Pour [facility]   9/11/2018 3:30 PM Ania Lawler, PT [facility]   9/13/2018 3:30 PM Marisol Vega [address] [place] [address] not examined

## 2020-06-12 NOTE — H&P PST ADULT - ASSESSMENT
CAPRINI SCORE [CLOT updated 18]    AGE RELATED RISK FACTORS                                                       MOBILITY RELATED FACTORS  [ ] Age 41-60 years                                            (1 Point)                    [ ] Bed rest                                                        (1 Point)  [ ] Age: 61-74 years                                           (2 Points)                  [ ] Plaster cast                                                   (2 Points)  [ ] Age= 75 years                                              (3 Points)                    [ ] Bed bound for more than 72 hours                 (2 Points)    DISEASE RELATED RISK FACTORS                                               GENDER SPECIFIC FACTORS  [ ] Edema in the lower extremities                       (1 Point)              [ ] Pregnancy                                                     (1 Point)  [ ] Varicose veins                                               (1 Point)                     [ ] Post-partum < 6 weeks                                   (1 Point)             [1 ] BMI > 25 Kg/m2                                            (1 Point)                     [ ] Hormonal therapy  or oral contraception          (1 Point)                 [ ] Sepsis (in the previous month)                        (1 Point)               [ ] History of pregnancy complications                 (1 point)  [ ] Pneumonia or serious lung disease                                               [ ] Unexplained or recurrent                     (1 Point)           (in the previous month)                               (1 Point)  [ ] Abnormal pulmonary function test                     (1 Point)                 SURGERY RELATED RISK FACTORS  [ ] Acute myocardial infarction                              (1 Point)               [ ]  Section                                             (1 Point)  [ ] Congestive heart failure (in the previous month)  (1 Point)      [ ] Minor surgery                                                  (1 Point)   [ ] Inflammatory bowel disease                             (1 Point)               [ ] Arthroscopic surgery                                        (2 Points)  [ ] Central venous access                                      (2 Points)                [ 2] General surgery lasting more than 45 minutes (2 points)  [ ] Present or previous malignancy                     (2 Points)                [ ] Elective arthroplasty                                         (5 points)    [ ] Stroke (in the previous month)                          (5 Points)                                                                                                                                                           HEMATOLOGY RELATED FACTORS                                                 TRAUMA RELATED RISK FACTORS  [ ] Prior episodes of VTE                                     (3 Points)                [ ] Fracture of the hip, pelvis, or leg                       (5 Points)  [ ] Positive family history for VTE                         (3 Points)             [ ] Acute spinal cord injury (in the previous month)  (5 Points)  [ ] Prothrombin 71938 A                                     (3 Points)               [ ] Paralysis  (less than 1 month)                             (5 Points)  [ ] Factor V Leiden                                             (3 Points)                  [ ] Multiple Trauma within 1 month                        (5 Points)  [ ] Lupus anticoagulants                                     (3 Points)                                                           [ ] Anticardiolipin antibodies                               (3 Points)                                                       [ ] High homocysteine in the blood                      (3 Points)                                             [ ] Other congenital or acquired thrombophilia      (3 Points)                                                [ ] Heparin induced thrombocytopenia                  (3 Points)                                     Total Score [3  ]

## 2020-06-21 ENCOUNTER — APPOINTMENT (OUTPATIENT)
Dept: DISASTER EMERGENCY | Facility: CLINIC | Age: 25
End: 2020-06-21

## 2020-06-23 ENCOUNTER — TRANSCRIPTION ENCOUNTER (OUTPATIENT)
Age: 25
End: 2020-06-23

## 2020-06-24 ENCOUNTER — APPOINTMENT (OUTPATIENT)
Dept: SURGERY | Facility: HOSPITAL | Age: 25
End: 2020-06-24
Payer: COMMERCIAL

## 2020-06-24 ENCOUNTER — RESULT REVIEW (OUTPATIENT)
Age: 25
End: 2020-06-24

## 2020-06-24 ENCOUNTER — INPATIENT (INPATIENT)
Facility: HOSPITAL | Age: 25
LOS: 0 days | Discharge: HOME | DRG: 621 | End: 2020-06-25
Attending: SURGERY | Admitting: SURGERY
Payer: COMMERCIAL

## 2020-06-24 VITALS
HEART RATE: 72 BPM | OXYGEN SATURATION: 97 % | DIASTOLIC BLOOD PRESSURE: 74 MMHG | RESPIRATION RATE: 20 BRPM | SYSTOLIC BLOOD PRESSURE: 110 MMHG | WEIGHT: 260.37 LBS | TEMPERATURE: 98 F | HEIGHT: 64 IN

## 2020-06-24 DIAGNOSIS — E66.01 MORBID (SEVERE) OBESITY DUE TO EXCESS CALORIES: ICD-10-CM

## 2020-06-24 DIAGNOSIS — Z98.890 OTHER SPECIFIED POSTPROCEDURAL STATES: Chronic | ICD-10-CM

## 2020-06-24 LAB
ANION GAP SERPL CALC-SCNC: 13 MMOL/L — SIGNIFICANT CHANGE UP (ref 5–17)
BASOPHILS # BLD AUTO: 0.04 K/UL — SIGNIFICANT CHANGE UP (ref 0–0.2)
BASOPHILS NFR BLD AUTO: 0.4 % — SIGNIFICANT CHANGE UP (ref 0–2)
BUN SERPL-MCNC: 5 MG/DL — LOW (ref 7–23)
CALCIUM SERPL-MCNC: 8.5 MG/DL — SIGNIFICANT CHANGE UP (ref 8.4–10.5)
CHLORIDE SERPL-SCNC: 97 MMOL/L — SIGNIFICANT CHANGE UP (ref 96–108)
CO2 SERPL-SCNC: 22 MMOL/L — SIGNIFICANT CHANGE UP (ref 22–31)
CREAT SERPL-MCNC: 0.76 MG/DL — SIGNIFICANT CHANGE UP (ref 0.5–1.3)
EOSINOPHIL # BLD AUTO: 0.04 K/UL — SIGNIFICANT CHANGE UP (ref 0–0.5)
EOSINOPHIL NFR BLD AUTO: 0.4 % — SIGNIFICANT CHANGE UP (ref 0–6)
GLUCOSE BLDC GLUCOMTR-MCNC: 82 MG/DL — SIGNIFICANT CHANGE UP (ref 70–99)
GLUCOSE SERPL-MCNC: 178 MG/DL — HIGH (ref 70–99)
HCG UR QL: NEGATIVE — SIGNIFICANT CHANGE UP
HCT VFR BLD CALC: 38.4 % — SIGNIFICANT CHANGE UP (ref 34.5–45)
HGB BLD-MCNC: 12.3 G/DL — SIGNIFICANT CHANGE UP (ref 11.5–15.5)
IMM GRANULOCYTES NFR BLD AUTO: 0.3 % — SIGNIFICANT CHANGE UP (ref 0–1.5)
LYMPHOCYTES # BLD AUTO: 2.26 K/UL — SIGNIFICANT CHANGE UP (ref 1–3.3)
LYMPHOCYTES # BLD AUTO: 24.5 % — SIGNIFICANT CHANGE UP (ref 13–44)
MAGNESIUM SERPL-MCNC: 1.8 MG/DL — SIGNIFICANT CHANGE UP (ref 1.6–2.6)
MCHC RBC-ENTMCNC: 30.6 PG — SIGNIFICANT CHANGE UP (ref 27–34)
MCHC RBC-ENTMCNC: 32 GM/DL — SIGNIFICANT CHANGE UP (ref 32–36)
MCV RBC AUTO: 95.5 FL — SIGNIFICANT CHANGE UP (ref 80–100)
MONOCYTES # BLD AUTO: 0.31 K/UL — SIGNIFICANT CHANGE UP (ref 0–0.9)
MONOCYTES NFR BLD AUTO: 3.4 % — SIGNIFICANT CHANGE UP (ref 2–14)
NEUTROPHILS # BLD AUTO: 6.56 K/UL — SIGNIFICANT CHANGE UP (ref 1.8–7.4)
NEUTROPHILS NFR BLD AUTO: 71 % — SIGNIFICANT CHANGE UP (ref 43–77)
NRBC # BLD: 0 /100 WBCS — SIGNIFICANT CHANGE UP (ref 0–0)
PHOSPHATE SERPL-MCNC: 2.1 MG/DL — LOW (ref 2.5–4.5)
PLATELET # BLD AUTO: 417 K/UL — HIGH (ref 150–400)
POTASSIUM SERPL-MCNC: 3 MMOL/L — LOW (ref 3.5–5.3)
POTASSIUM SERPL-SCNC: 3 MMOL/L — LOW (ref 3.5–5.3)
RBC # BLD: 4.02 M/UL — SIGNIFICANT CHANGE UP (ref 3.8–5.2)
RBC # FLD: 12 % — SIGNIFICANT CHANGE UP (ref 10.3–14.5)
SARS-COV-2 IGG SERPL QL IA: POSITIVE
SARS-COV-2 IGM SERPL IA-ACNC: 43.4 AU/ML — HIGH
SODIUM SERPL-SCNC: 132 MMOL/L — LOW (ref 135–145)
WBC # BLD: 9.24 K/UL — SIGNIFICANT CHANGE UP (ref 3.8–10.5)
WBC # FLD AUTO: 9.24 K/UL — SIGNIFICANT CHANGE UP (ref 3.8–10.5)

## 2020-06-24 PROCEDURE — 43775 LAP SLEEVE GASTRECTOMY: CPT

## 2020-06-24 PROCEDURE — 88305 TISSUE EXAM BY PATHOLOGIST: CPT | Mod: 26

## 2020-06-24 RX ORDER — HYOSCYAMINE SULFATE 0.13 MG
0.12 TABLET ORAL EVERY 6 HOURS
Refills: 0 | Status: DISCONTINUED | OUTPATIENT
Start: 2020-06-24 | End: 2020-06-25

## 2020-06-24 RX ORDER — POTASSIUM CHLORIDE 20 MEQ
10 PACKET (EA) ORAL
Refills: 0 | Status: COMPLETED | OUTPATIENT
Start: 2020-06-24 | End: 2020-06-24

## 2020-06-24 RX ORDER — HYOSCYAMINE SULFATE 0.13 MG
1 TABLET ORAL
Qty: 28 | Refills: 0
Start: 2020-06-24 | End: 2020-06-30

## 2020-06-24 RX ORDER — ACETAMINOPHEN 500 MG
15 TABLET ORAL
Qty: 300 | Refills: 0
Start: 2020-06-24 | End: 2020-06-28

## 2020-06-24 RX ORDER — FAMOTIDINE 10 MG/ML
20 INJECTION INTRAVENOUS ONCE
Refills: 0 | Status: COMPLETED | OUTPATIENT
Start: 2020-06-24 | End: 2020-06-24

## 2020-06-24 RX ORDER — THIAMINE MONONITRATE (VIT B1) 100 MG
100 TABLET ORAL ONCE
Refills: 0 | Status: COMPLETED | OUTPATIENT
Start: 2020-06-24 | End: 2020-06-24

## 2020-06-24 RX ORDER — SODIUM CHLORIDE 9 MG/ML
3 INJECTION INTRAMUSCULAR; INTRAVENOUS; SUBCUTANEOUS EVERY 8 HOURS
Refills: 0 | Status: DISCONTINUED | OUTPATIENT
Start: 2020-06-24 | End: 2020-06-24

## 2020-06-24 RX ORDER — VANCOMYCIN HCL 1 G
1500 VIAL (EA) INTRAVENOUS ONCE
Refills: 0 | Status: COMPLETED | OUTPATIENT
Start: 2020-06-24 | End: 2020-06-24

## 2020-06-24 RX ORDER — ONDANSETRON 8 MG/1
1 TABLET, FILM COATED ORAL
Qty: 28 | Refills: 0
Start: 2020-06-24 | End: 2020-06-30

## 2020-06-24 RX ORDER — POTASSIUM PHOSPHATE, MONOBASIC POTASSIUM PHOSPHATE, DIBASIC 236; 224 MG/ML; MG/ML
15 INJECTION, SOLUTION INTRAVENOUS ONCE
Refills: 0 | Status: COMPLETED | OUTPATIENT
Start: 2020-06-24 | End: 2020-06-24

## 2020-06-24 RX ORDER — ACETAMINOPHEN 500 MG
1000 TABLET ORAL ONCE
Refills: 0 | Status: COMPLETED | OUTPATIENT
Start: 2020-06-24 | End: 2020-06-24

## 2020-06-24 RX ORDER — ONDANSETRON 8 MG/1
4 TABLET, FILM COATED ORAL EVERY 6 HOURS
Refills: 0 | Status: DISCONTINUED | OUTPATIENT
Start: 2020-06-24 | End: 2020-06-25

## 2020-06-24 RX ORDER — HALOPERIDOL DECANOATE 100 MG/ML
1 INJECTION INTRAMUSCULAR ONCE
Refills: 0 | Status: DISCONTINUED | OUTPATIENT
Start: 2020-06-24 | End: 2020-06-25

## 2020-06-24 RX ORDER — HEPARIN SODIUM 5000 [USP'U]/ML
5000 INJECTION INTRAVENOUS; SUBCUTANEOUS EVERY 8 HOURS
Refills: 0 | Status: DISCONTINUED | OUTPATIENT
Start: 2020-06-24 | End: 2020-06-25

## 2020-06-24 RX ORDER — SODIUM CHLORIDE 9 MG/ML
1000 INJECTION, SOLUTION INTRAVENOUS
Refills: 0 | Status: DISCONTINUED | OUTPATIENT
Start: 2020-06-24 | End: 2020-06-25

## 2020-06-24 RX ORDER — OMEPRAZOLE 10 MG/1
1 CAPSULE, DELAYED RELEASE ORAL
Qty: 30 | Refills: 0
Start: 2020-06-24 | End: 2020-07-23

## 2020-06-24 RX ORDER — LIDOCAINE HCL 20 MG/ML
0.2 VIAL (ML) INJECTION ONCE
Refills: 0 | Status: COMPLETED | OUTPATIENT
Start: 2020-06-24 | End: 2020-06-24

## 2020-06-24 RX ORDER — PANTOPRAZOLE SODIUM 20 MG/1
40 TABLET, DELAYED RELEASE ORAL EVERY 24 HOURS
Refills: 0 | Status: DISCONTINUED | OUTPATIENT
Start: 2020-06-24 | End: 2020-06-25

## 2020-06-24 RX ORDER — HYDROMORPHONE HYDROCHLORIDE 2 MG/ML
0.25 INJECTION INTRAMUSCULAR; INTRAVENOUS; SUBCUTANEOUS
Refills: 0 | Status: DISCONTINUED | OUTPATIENT
Start: 2020-06-24 | End: 2020-06-24

## 2020-06-24 RX ORDER — KETOROLAC TROMETHAMINE 30 MG/ML
15 SYRINGE (ML) INJECTION EVERY 6 HOURS
Refills: 0 | Status: DISCONTINUED | OUTPATIENT
Start: 2020-06-24 | End: 2020-06-25

## 2020-06-24 RX ORDER — HEPARIN SODIUM 5000 [USP'U]/ML
5000 INJECTION INTRAVENOUS; SUBCUTANEOUS ONCE
Refills: 0 | Status: COMPLETED | OUTPATIENT
Start: 2020-06-24 | End: 2020-06-24

## 2020-06-24 RX ORDER — FENTANYL CITRATE 50 UG/ML
25 INJECTION INTRAVENOUS
Refills: 0 | Status: DISCONTINUED | OUTPATIENT
Start: 2020-06-24 | End: 2020-06-24

## 2020-06-24 RX ORDER — ONDANSETRON 8 MG/1
4 TABLET, FILM COATED ORAL ONCE
Refills: 0 | Status: COMPLETED | OUTPATIENT
Start: 2020-06-24 | End: 2020-06-24

## 2020-06-24 RX ORDER — FOLIC ACID 0.8 MG
1 TABLET ORAL ONCE
Refills: 0 | Status: COMPLETED | OUTPATIENT
Start: 2020-06-24 | End: 2020-06-24

## 2020-06-24 RX ADMIN — Medication 1 MILLIGRAM(S): at 11:30

## 2020-06-24 RX ADMIN — Medication 100 MILLIEQUIVALENT(S): at 11:53

## 2020-06-24 RX ADMIN — SODIUM CHLORIDE 150 MILLILITER(S): 9 INJECTION, SOLUTION INTRAVENOUS at 17:44

## 2020-06-24 RX ADMIN — HEPARIN SODIUM 5000 UNIT(S): 5000 INJECTION INTRAVENOUS; SUBCUTANEOUS at 22:17

## 2020-06-24 RX ADMIN — PANTOPRAZOLE SODIUM 40 MILLIGRAM(S): 20 TABLET, DELAYED RELEASE ORAL at 11:04

## 2020-06-24 RX ADMIN — Medication 0.12 MILLIGRAM(S): at 17:05

## 2020-06-24 RX ADMIN — Medication 100 MILLIEQUIVALENT(S): at 18:43

## 2020-06-24 RX ADMIN — ONDANSETRON 4 MILLIGRAM(S): 8 TABLET, FILM COATED ORAL at 11:30

## 2020-06-24 RX ADMIN — HYDROMORPHONE HYDROCHLORIDE 0.25 MILLIGRAM(S): 2 INJECTION INTRAMUSCULAR; INTRAVENOUS; SUBCUTANEOUS at 11:40

## 2020-06-24 RX ADMIN — Medication 0.25 MILLIGRAM(S): at 10:04

## 2020-06-24 RX ADMIN — SODIUM CHLORIDE 250 MILLILITER(S): 9 INJECTION, SOLUTION INTRAVENOUS at 10:50

## 2020-06-24 RX ADMIN — Medication 100 MILLIEQUIVALENT(S): at 14:39

## 2020-06-24 RX ADMIN — Medication 15 MILLIGRAM(S): at 15:57

## 2020-06-24 RX ADMIN — ONDANSETRON 4 MILLIGRAM(S): 8 TABLET, FILM COATED ORAL at 17:05

## 2020-06-24 RX ADMIN — Medication 300 MILLIGRAM(S): at 22:59

## 2020-06-24 RX ADMIN — ONDANSETRON 4 MILLIGRAM(S): 8 TABLET, FILM COATED ORAL at 09:25

## 2020-06-24 RX ADMIN — Medication 400 MILLIGRAM(S): at 14:39

## 2020-06-24 RX ADMIN — Medication 100 MILLIGRAM(S): at 11:04

## 2020-06-24 RX ADMIN — HEPARIN SODIUM 5000 UNIT(S): 5000 INJECTION INTRAVENOUS; SUBCUTANEOUS at 14:40

## 2020-06-24 RX ADMIN — SODIUM CHLORIDE 3 MILLILITER(S): 9 INJECTION INTRAMUSCULAR; INTRAVENOUS; SUBCUTANEOUS at 06:15

## 2020-06-24 RX ADMIN — Medication 400 MILLIGRAM(S): at 22:18

## 2020-06-24 RX ADMIN — FENTANYL CITRATE 25 MICROGRAM(S): 50 INJECTION INTRAVENOUS at 09:46

## 2020-06-24 RX ADMIN — POTASSIUM PHOSPHATE, MONOBASIC POTASSIUM PHOSPHATE, DIBASIC 62.5 MILLIMOLE(S): 236; 224 INJECTION, SOLUTION INTRAVENOUS at 14:45

## 2020-06-24 RX ADMIN — Medication 0.2 MILLILITER(S): at 06:15

## 2020-06-24 RX ADMIN — HEPARIN SODIUM 5000 UNIT(S): 5000 INJECTION INTRAVENOUS; SUBCUTANEOUS at 06:08

## 2020-06-24 RX ADMIN — Medication 0.12 MILLIGRAM(S): at 11:30

## 2020-06-24 RX ADMIN — Medication 15 MILLIGRAM(S): at 22:18

## 2020-06-24 RX ADMIN — FAMOTIDINE 20 MILLIGRAM(S): 10 INJECTION INTRAVENOUS at 10:59

## 2020-06-24 NOTE — CHART NOTE - NSCHARTNOTEFT_GEN_A_CORE
[facility] POST-OP NOTE    PROCEDURE: Laparoscopic sleeve gastrectomy      SUBJECTIVE/ROS: Patient seen and examined at bedside.  Patient c/o nausea in PACU, relieved with zofran.  She has some epigastric pain that is controlled with dilaudid.  She voided in PACU but was unsaved. She denies chest pain, SOB.         MEDICATIONS  (STANDING):  acetaminophen  IVPB .. 1000 milliGRAM(s) IV Intermittent once  acetaminophen  IVPB .. 1000 milliGRAM(s) IV Intermittent once  hyoscyamine SL 0.125 milliGRAM(s) SubLingual every 6 hours  ketorolac   Injectable 15 milliGRAM(s) IV Push every 6 hours  lactated ringers. 1000 milliLiter(s) (150 mL/Hr) IV Continuous <Continuous>  ondansetron Injectable 4 milliGRAM(s) IV Push every 6 hours  pantoprazole  Injectable 40 milliGRAM(s) IV Push every 24 hours  potassium chloride  10 mEq/100 mL IVPB 10 milliEquivalent(s) IV Intermittent every 1 hour  potassium phosphate IVPB 15 milliMole(s) IV Intermittent once  sodium chloride 0.9% 1000 milliLiter(s) (250 mL/Hr) IV Continuous <Continuous>  vancomycin  IVPB 1500 milliGRAM(s) IV Intermittent once  vancomycin  IVPB 1500 milliGRAM(s) IV Intermittent once    MEDICATIONS  (PRN):  haloperidol    Injectable 1 milliGRAM(s) IV Push once PRN nausea vomit  HYDROmorphone  Injectable 0.25 milliGRAM(s) IV Push every 10 minutes PRN Moderate Pain (4 - 6)      OBJECTIVE:    Vital Signs Last 24 Hrs  T(C): 36.2 (24 Jun 2020 09:06), Max: 36.8 (24 Jun 2020 05:48)  T(F): 97.2 (24 Jun 2020 09:06), Max: 98.2 (24 Jun 2020 05:48)  HR: 82 (24 Jun 2020 11:30) (72 - 83)  BP: 133/76 (24 Jun 2020 11:30) (110/74 - 189/81)  BP(mean): 98 (24 Jun 2020 11:30) (90 - 116)  RR: 16 (24 Jun 2020 11:30) (14 - 20)  SpO2: 100% (24 Jun 2020 11:30) (97% - 100%)        I&O's Detail    24 Jun 2020 07:01  -  24 Jun 2020 12:18  --------------------------------------------------------  IN:    sodium chloride 0.9%: 750 mL  Total IN: 750 mL    OUT:    Emesis: 50 mL  Total OUT: 50 mL    Total NET: 700 mL          Daily Height in cm: 162.56 (24 Jun 2020 06:58)    Daily     LABS:                        12.3   9.24  )-----------( 417      ( 24 Jun 2020 10:07 )             38.4     06-24    132<L>  |  97  |  5<L>  ----------------------------<  178<H>  3.0<L>   |  22  |  0.76    Ca    8.5      24 Jun 2020 10:07  Phos  2.1     06-24  Mg     1.8     06-24                    PHYSICAL EXAM:  Constitutional: well developed, well nourished, NAD  Respiratory: No increased WOB  Abdomen: soft, appropriate tenderness.  Steris c/d/i.  Psychiatric: oriented x 3; appropriate        ASSESSMENT/PLAN: 26 y/o female with morbid obesity, unable to loose weight by diet or exercise, POD#0 s/p laparoscopic sleeve gastrectomy    - Pain control  - f/u UOP (DTV @ 2PM)  - Monitor and replete lytes  - Can have Justo Clears 6 hrs post-op if nausea resolved  - oob/amb/IS  - DVT ppx      [facility] [id number]

## 2020-06-24 NOTE — PRE-ANESTHESIA EVALUATION ADULT - NSANTHPMHFT_GEN_ALL_CORE
25F hx of mild DORA, PCOS, receiving laparoscopic vertical sleeve. Denies any chest pain, sob at rest or with activity. Allergic to penicillin- anaphylaxis.

## 2020-06-25 ENCOUNTER — TRANSCRIPTION ENCOUNTER (OUTPATIENT)
Age: 25
End: 2020-06-25

## 2020-06-25 VITALS
HEART RATE: 67 BPM | SYSTOLIC BLOOD PRESSURE: 140 MMHG | DIASTOLIC BLOOD PRESSURE: 86 MMHG | TEMPERATURE: 99 F | OXYGEN SATURATION: 99 % | RESPIRATION RATE: 18 BRPM

## 2020-06-25 LAB
ANION GAP SERPL CALC-SCNC: 10 MMOL/L — SIGNIFICANT CHANGE UP (ref 5–17)
BASOPHILS # BLD AUTO: 0.02 K/UL — SIGNIFICANT CHANGE UP (ref 0–0.2)
BASOPHILS NFR BLD AUTO: 0.2 % — SIGNIFICANT CHANGE UP (ref 0–2)
BUN SERPL-MCNC: <4 MG/DL — LOW (ref 7–23)
CALCIUM SERPL-MCNC: 9.1 MG/DL — SIGNIFICANT CHANGE UP (ref 8.4–10.5)
CHLORIDE SERPL-SCNC: 105 MMOL/L — SIGNIFICANT CHANGE UP (ref 96–108)
CO2 SERPL-SCNC: 24 MMOL/L — SIGNIFICANT CHANGE UP (ref 22–31)
CREAT SERPL-MCNC: 0.8 MG/DL — SIGNIFICANT CHANGE UP (ref 0.5–1.3)
EOSINOPHIL # BLD AUTO: 0.01 K/UL — SIGNIFICANT CHANGE UP (ref 0–0.5)
EOSINOPHIL NFR BLD AUTO: 0.1 % — SIGNIFICANT CHANGE UP (ref 0–6)
GLUCOSE SERPL-MCNC: 82 MG/DL — SIGNIFICANT CHANGE UP (ref 70–99)
HCT VFR BLD CALC: 36.4 % — SIGNIFICANT CHANGE UP (ref 34.5–45)
HGB BLD-MCNC: 11.5 G/DL — SIGNIFICANT CHANGE UP (ref 11.5–15.5)
IMM GRANULOCYTES NFR BLD AUTO: 0.3 % — SIGNIFICANT CHANGE UP (ref 0–1.5)
LYMPHOCYTES # BLD AUTO: 2.94 K/UL — SIGNIFICANT CHANGE UP (ref 1–3.3)
LYMPHOCYTES # BLD AUTO: 29.1 % — SIGNIFICANT CHANGE UP (ref 13–44)
MCHC RBC-ENTMCNC: 29.8 PG — SIGNIFICANT CHANGE UP (ref 27–34)
MCHC RBC-ENTMCNC: 31.6 GM/DL — LOW (ref 32–36)
MCV RBC AUTO: 94.3 FL — SIGNIFICANT CHANGE UP (ref 80–100)
MONOCYTES # BLD AUTO: 0.76 K/UL — SIGNIFICANT CHANGE UP (ref 0–0.9)
MONOCYTES NFR BLD AUTO: 7.5 % — SIGNIFICANT CHANGE UP (ref 2–14)
NEUTROPHILS # BLD AUTO: 6.34 K/UL — SIGNIFICANT CHANGE UP (ref 1.8–7.4)
NEUTROPHILS NFR BLD AUTO: 62.8 % — SIGNIFICANT CHANGE UP (ref 43–77)
NRBC # BLD: 0 /100 WBCS — SIGNIFICANT CHANGE UP (ref 0–0)
PLATELET # BLD AUTO: 383 K/UL — SIGNIFICANT CHANGE UP (ref 150–400)
POTASSIUM SERPL-MCNC: 3.7 MMOL/L — SIGNIFICANT CHANGE UP (ref 3.5–5.3)
POTASSIUM SERPL-MCNC: 3.8 MMOL/L — SIGNIFICANT CHANGE UP (ref 3.5–5.3)
POTASSIUM SERPL-SCNC: 3.7 MMOL/L — SIGNIFICANT CHANGE UP (ref 3.5–5.3)
POTASSIUM SERPL-SCNC: 3.8 MMOL/L — SIGNIFICANT CHANGE UP (ref 3.5–5.3)
RBC # BLD: 3.86 M/UL — SIGNIFICANT CHANGE UP (ref 3.8–5.2)
RBC # FLD: 12.2 % — SIGNIFICANT CHANGE UP (ref 10.3–14.5)
SODIUM SERPL-SCNC: 139 MMOL/L — SIGNIFICANT CHANGE UP (ref 135–145)
WBC # BLD: 10.1 K/UL — SIGNIFICANT CHANGE UP (ref 3.8–10.5)
WBC # FLD AUTO: 10.1 K/UL — SIGNIFICANT CHANGE UP (ref 3.8–10.5)

## 2020-06-25 PROCEDURE — 82962 GLUCOSE BLOOD TEST: CPT

## 2020-06-25 PROCEDURE — 84100 ASSAY OF PHOSPHORUS: CPT

## 2020-06-25 PROCEDURE — 86769 SARS-COV-2 COVID-19 ANTIBODY: CPT

## 2020-06-25 PROCEDURE — 88305 TISSUE EXAM BY PATHOLOGIST: CPT

## 2020-06-25 PROCEDURE — C1889: CPT

## 2020-06-25 PROCEDURE — C9399: CPT

## 2020-06-25 PROCEDURE — 84132 ASSAY OF SERUM POTASSIUM: CPT

## 2020-06-25 PROCEDURE — 81025 URINE PREGNANCY TEST: CPT

## 2020-06-25 PROCEDURE — 80048 BASIC METABOLIC PNL TOTAL CA: CPT

## 2020-06-25 PROCEDURE — 83735 ASSAY OF MAGNESIUM: CPT

## 2020-06-25 PROCEDURE — 85027 COMPLETE CBC AUTOMATED: CPT

## 2020-06-25 RX ADMIN — PANTOPRAZOLE SODIUM 40 MILLIGRAM(S): 20 TABLET, DELAYED RELEASE ORAL at 12:05

## 2020-06-25 RX ADMIN — ONDANSETRON 4 MILLIGRAM(S): 8 TABLET, FILM COATED ORAL at 05:30

## 2020-06-25 RX ADMIN — Medication 15 MILLIGRAM(S): at 05:30

## 2020-06-25 RX ADMIN — Medication 0.12 MILLIGRAM(S): at 12:05

## 2020-06-25 RX ADMIN — HEPARIN SODIUM 5000 UNIT(S): 5000 INJECTION INTRAVENOUS; SUBCUTANEOUS at 14:02

## 2020-06-25 RX ADMIN — SODIUM CHLORIDE 150 MILLILITER(S): 9 INJECTION, SOLUTION INTRAVENOUS at 00:21

## 2020-06-25 RX ADMIN — HEPARIN SODIUM 5000 UNIT(S): 5000 INJECTION INTRAVENOUS; SUBCUTANEOUS at 05:30

## 2020-06-25 RX ADMIN — ONDANSETRON 4 MILLIGRAM(S): 8 TABLET, FILM COATED ORAL at 00:13

## 2020-06-25 RX ADMIN — Medication 0.12 MILLIGRAM(S): at 05:30

## 2020-06-25 RX ADMIN — Medication 0.12 MILLIGRAM(S): at 00:13

## 2020-06-25 NOTE — DIETITIAN INITIAL EVALUATION ADULT. - PHYSICAL APPEARANCE
well nourished/other (specify)/Class III/obese Ht: 64 inches, Wt: 252lbs, BMI: 43.3kg/m2, IBW: 120lbs +/- 10%, %IBW:210 %

## 2020-06-25 NOTE — PROGRESS NOTE ADULT - SUBJECTIVE AND OBJECTIVE BOX
Post Op Day#: 1    Subjective: "Doing well, I have no N/V    Objective: No acute events overnight. Effective pain control, denies N/V. Continuous pulse oximetry at bedside functioning,  v/s stable, afebrile. This AM Labs pendng.  Ambulating independently around the unit last night.  Started bariatric clear liquid diet and tolerating it.  Voiding as expected.                                               Vital Signs Last 24 Hrs  T(C): 36.9 (25 Jun 2020 05:47), Max: 37.3 (24 Jun 2020 21:26)  T(F): 98.5 (25 Jun 2020 05:47), Max: 99.2 (24 Jun 2020 21:26)  HR: 63 (25 Jun 2020 05:47) (63 - 86)  BP: 139/81 (25 Jun 2020 05:47) (128/84 - 189/81)  BP(mean): 113 (24 Jun 2020 13:00) (90 - 116)  RR: 18 (25 Jun 2020 05:47) (14 - 18)  SpO2: 96% (25 Jun 2020 05:47) (93% - 100%)                                               I&O's Summary    24 Jun 2020 07:01  -  25 Jun 2020 07:00  --------------------------------------------------------  IN: 2250 mL / OUT: 2305 mL / NET: -55 mL                                                                          11.5   10.10 )-----------( 383      ( 25 Jun 2020 06:42 )             36.4                                                 06-25    139  |  105  |  x   ----------------------------<  82  3.7   |  24  |  0.80    Ca    9.1      25 Jun 2020 06:42  Phos  2.1     06-24  Mg     1.8     06-24      haloperidol    Injectable 1 milliGRAM(s) IV Push once PRN  heparin   Injectable 5000 Unit(s) SubCutaneous every 8 hours  hyoscyamine SL 0.125 milliGRAM(s) SubLingual every 6 hours  ketorolac   Injectable 15 milliGRAM(s) IV Push every 6 hours  lactated ringers. 1000 milliLiter(s) IV Continuous <Continuous>  ondansetron Injectable 4 milliGRAM(s) IV Push every 6 hours  pantoprazole  Injectable 40 milliGRAM(s) IV Push every 24 hours  sodium chloride 0.9% 1000 milliLiter(s) IV Continuous <Continuous>      Physical Exam:         Lungs:  clear breath sounds b/l       Heart:  Regular rate & rhythm       Abdomen:  Soft, non-distended.  Scopes sites clean, dry and intact. + bs, - flatus, no rebound or guarding       Skin:  intact, pannus w/o rash       Extremities: + pulses, no edema, no calf tenderness, negative madhu's     VTE Risk Assessment Scores             Caprini VTE Risk Score:                                                               3-4 (moderate ), Perioperative and Postoperative VTE prophylaxis   [facility]  VTE Predicted RISK Low: <1%, No post-discharge extended VTE prophylaxis      Assessment and Plan: Lap Sleeve Gastrectomy    - Bariatric Clear diet today then protocol derived staged meal progression supervised by RD in outpatient setting  - DVT/GI prophylaxis, Incentive spirometry  - Ambulate Q 2 hours or as tolerated  -  Procedure specific education including diet, vitamins and VTE prevention. Written materials given  - Medication reviewed and reconciled, Bariatric meds obtained from [facility] prior to d/c  -  D/C home today once Bariatric 8-Point d/c criteria met  -  Follow up with Dr Ramsay in 7-10 days, Dietitian and PMD in 30 days.      Stella Sutton DNP, ANP  [phone]

## 2020-06-25 NOTE — PHARMACOTHERAPY INTERVENTION NOTE - COMMENTS
Vertical sleeve gastrectomy scheduled for 6/24/2020.  Patient medication reconciliation completed. Patient currently not taking any medications - plans to use MVI + iron patch daily, and biotin patch daily. Patient was instructed to use crushed, dissolvable, chewable, or liquid formulations of medications for 1 month, if needed.  Patient was informed to take daily multivitamins post surgically. Patient reeducated on NSAID avoidance (ibuprofen, ASA, naproxen, aleve) as they increased risk of GI bleeding; may use APAP for mild pain otherwise contact prescriber for consult. Patient was informed on indications and directions for administration for hyoscyamine SL, acetaminophen liquid, ondansetron ODT, and omeprazole DR. Lara pre-op opioids  Post-op opioids:    PACU: 12.5 MME (IV Fentanyl, IV hydromorphone)    2 [place]avo: 0 MME  Home: 0 MME     Demond Tinoco, SeveroD, BCPS  [phone]

## 2020-06-25 NOTE — DISCHARGE NOTE PROVIDER - NSDCACTIVITY_GEN_ALL_CORE
Stairs allowed/No heavy lifting/straining/Showering allowed/Walking - Outdoors allowed/Walking - Indoors allowed

## 2020-06-25 NOTE — DISCHARGE NOTE PROVIDER - NSDCMRMEDTOKEN_GEN_ALL_CORE_FT
acetaminophen 500 mg/15 mL oral liquid: 15 milliliter(s) orally every 6 hours, As Needed   hyoscyamine 0.125 mg sublingual tablet: 1 tab(s) sublingual every 6 hours, As Needed MDD:4   omeprazole 40 mg oral delayed release capsule: 1 cap(s) orally once a day MDD:1 open and mix in applesauce  ondansetron 4 mg oral tablet, disintegratin tab(s) orally every 6 hours, As Needed

## 2020-06-25 NOTE — DISCHARGE NOTE PROVIDER - NSDCCPCAREPLAN_GEN_ALL_CORE_FT
PRINCIPAL DISCHARGE DIAGNOSIS  Diagnosis: Morbid obesity  Assessment and Plan of Treatment:       SECONDARY DISCHARGE DIAGNOSES  Diagnosis: Obstructive sleep apnea  Assessment and Plan of Treatment:

## 2020-06-25 NOTE — PROGRESS NOTE ADULT - SUBJECTIVE AND OBJECTIVE BOX
GENERAL SURGERY PROGRESS NOTE    25yFemale    SUBJECTIVE:  Patient seen and examined at bedside. No acute events overnight.   Denies fevers, chills, nausea, vomiting, chest pain, and shortness of breath.   GI function: -flatus. -BM.   Tolerating CLD.     --------------------------------------------------------------------------------------------------  OBJECTIVE:     Vital Signs:  Vital Signs Last 24 Hrs  T(C): 37.2 (25 Jun 2020 00:25), Max: 37.3 (24 Jun 2020 21:26)  T(F): 98.9 (25 Jun 2020 00:25), Max: 99.2 (24 Jun 2020 21:26)  HR: 65 (25 Jun 2020 00:25) (64 - 86)  BP: 132/82 (25 Jun 2020 00:25) (110/74 - 189/81)  BP(mean): 113 (24 Jun 2020 13:00) (90 - 116)  RR: 18 (25 Jun 2020 00:25) (14 - 20)  SpO2: 98% (25 Jun 2020 00:25) (93% - 100%)    --------------------------------------------------------------------------------------------------  Inputs/Outputs:    24 Jun 2020 07:01  -  25 Jun 2020 01:51  --------------------------------------------------------  IN:    lactated ringers.: 450 mL    sodium chloride 0.9%: 1250 mL    Solution: 200 mL    Solution: 100 mL    Solution: 250 mL  Total IN: 2250 mL    OUT:    Emesis: 50 mL    Voided: 2075 mL  Total OUT: 2125 mL    Total NET: 125 mL        --------------------------------------------------------------------------------------------------  Laboratories:                        12.3   9.24  )-----------( 417      ( 24 Jun 2020 10:07 )             38.4         25 Jun 2020 00:22    x      |  x      |  x      ----------------------------<  x      3.8     |  x      |  x        Ca    8.5        24 Jun 2020 10:07  Phos  2.1       24 Jun 2020 10:07  Mg     1.8       24 Jun 2020 10:07    --------------------------------------------------------------------------------------------------  Physical Exam:  General: AAOx3, NAD, resting comfortably   HEENT: NC/AT  Respiratory: no increased work of breathing   Abdomen: soft, nontender, nondistended  Extremities: warm and well perfused  --------------------------------------------------------------------------------------------------  Medications:  MEDICATIONS  (STANDING):  heparin   Injectable 5000 Unit(s) SubCutaneous every 8 hours  hyoscyamine SL 0.125 milliGRAM(s) SubLingual every 6 hours  ketorolac   Injectable 15 milliGRAM(s) IV Push every 6 hours  lactated ringers. 1000 milliLiter(s) (150 mL/Hr) IV Continuous <Continuous>  ondansetron Injectable 4 milliGRAM(s) IV Push every 6 hours  pantoprazole  Injectable 40 milliGRAM(s) IV Push every 24 hours  sodium chloride 0.9% 1000 milliLiter(s) (250 mL/Hr) IV Continuous <Continuous>    MEDICATIONS  (PRN):  haloperidol    Injectable 1 milliGRAM(s) IV Push once PRN nausea vomit

## 2020-06-25 NOTE — DIETITIAN INITIAL EVALUATION ADULT. - ADD RECOMMEND
1) Bariatric Full liquid diet education done 2) Upon discharge, recommend liquid/chewable/crushable/patch of vitamin/mineral supplementation as discussed above.

## 2020-06-25 NOTE — PROGRESS NOTE ADULT - ASSESSMENT
ASSESSMENT:  26 y/o female with morbid obesity, unable to loose weight by diet or exercise, POD#1 s/p laparoscopic sleeve gastrectomy    PLAN:  - Pain control  - Diet: Justo Clears   - Monitor voids   - F/U AM labs   - oob/amb/IS  - DVT ppx    [facility]   [id number] ASSESSMENT:  26 y/o female with morbid obesity, unable to loose weight by diet or exercise, POD#1 s/p laparoscopic sleeve gastrectomy    PLAN:  - Pain control  - Diet: Justo Clears   - Monitor voids   - F/U AM labs   - oob/amb/IS  - DVT ppx    [facility]   [id number]    Addendum  Patient was seen/examined by MIS/bariatric fellow. Agree with resident note.  Patient complains of nausea/vomiting that occurred immediately post op, but it has since resolved.  She just started taking clear liquids and so far is tolerating them.  She has been ambulating/out of bed.  Denies reflux.  Plan to discharge when meets goals.  Krystal Kramer MD

## 2020-06-25 NOTE — DISCHARGE NOTE PROVIDER - NSDCCPTREATMENT_GEN_ALL_CORE_FT
PRINCIPAL PROCEDURE  Procedure: Laparoscopic sleeve gastrectomy  Findings and Treatment: analgesia, bariatric diet, increase fluid intake, increase physical acitivity, follow up care, dietary supplement

## 2020-06-25 NOTE — DIETITIAN INITIAL EVALUATION ADULT. - OTHER INFO
Pt reports following a full liquid diet for 2 weeks PTA as instructed by outpatient RD. Pt reports having Premier Clear Protein shakes, sugar-free Jello, Popsicles, puddings, pureed salmon, soups. Confirms NKFA. Denies micronutrient supplementation.     Pt seen for bariatric surgery consult on 2MON. Pt with multiple previous wt loss attempts per chart. Pt tried losing weight through diet  and was unable to lose and maintain significant wt loss. Per chart, pt met with outpatient RD and weighed 270  pounds (1/11/20). Pre-surgical wt (H&P) noted as 260 pounds (6/12/20). Current wt of 252 pounds (6/24/20). Pt now S/P laparoscopic vertical sleeve gastrectomy. Pt denies N+V, sipping on bariatric clear liquids during RD visit. Pt with knowledge of bariatric full liquid diet and receptive to in depth review/reinforcement. Pt reports home stock of protein shakes with plans to purchase more. Pt reports plan to purchase the necessary vitamins/minerals in chewable/liquid/crushable/patch form including a multivitamin with added elemental iron, calcium citrate with vitamin D, and sublingual B12. Pt was advised to take the multivitamin with elemental iron at least 2 hours apart from the calcium citrate with vitamin D for optimal absorption. Pt plans to schedule a follow up appointment with outpatient RD. Pt able to teach back all points discussed during interview. Patient with no nutrition-related questions at this time. Made aware RD remains available as needed.

## 2020-06-25 NOTE — DISCHARGE NOTE NURSING/CASE MANAGEMENT/SOCIAL WORK - PATIENT PORTAL LINK FT
You can access the FollowMyHealth Patient Portal offered by [facility] by registering at the following website: [email or web address]. By joining [facility]’s FollowMyHealth portal, you will also be able to view your health information using other applications (apps) compatible with our system.

## 2020-06-25 NOTE — DISCHARGE NOTE PROVIDER - CARE PROVIDER_API CALL
Diaz Ramsay  SURGERY  [address] [facility] [place] [place] [address]  Phone: [phone]  Fax: [phone]  Follow Up Time:

## 2020-06-25 NOTE — DISCHARGE NOTE PROVIDER - HOSPITAL COURSE
On June 24, 2020, Ms Akins who has a history of morbid obesity BMI 43 underwent Laparoscopic Sleeve Gastrectomy. Bariatric ERAS protocol followed to include preoperative and perioperative use of DVT, SSI prophylaxis and multi-modal non-opioid analgesia. Patient tolerated the procedure well extubated in the operating room then transferred to the PACU in stable condition. Once hemodynamically stable and effective pain control, she ambulated with assistance. Pt was also able to void within 4 hours following the procedure. The patient was later transferred to a bariatric unit and placed on bedside continuous pulse oximetry. Pain management included IV multi-modal non-opioid analgesics. The patient tolerated bariatric clear liquid the evening of surgery.        On POD #1 she remained stable with no acute events overnight, has effective  pain control, denies nausea and vomiting. She is ambulating independently and voiding as expected. The rest of her hospital course was uneventful, she met [facility] Bariatric Surgery D/C criteria and subsequently cleared for discharge home on POD#1. No post-discharge extended VTE prophylaxis needed, [facility] VTE Predicted Risk Stratification low (<1% ). The patient will follow a protocol-derived staged meal progression supervised by the dietitian in the outpatient setting. Patient will follow-up with Dr. Ramsay in 7-10 days, medical doctor and dietitian in 30 days. [facility] appropriate [facility] from [facility] prior to discharge. Written discharge instruction explained and given to include VTE prevention.

## 2020-07-06 ENCOUNTER — APPOINTMENT (OUTPATIENT)
Dept: SURGERY | Facility: CLINIC | Age: 25
End: 2020-07-06
Payer: COMMERCIAL

## 2020-07-06 VITALS
TEMPERATURE: 98 F | BODY MASS INDEX: 40.72 KG/M2 | SYSTOLIC BLOOD PRESSURE: 112 MMHG | DIASTOLIC BLOOD PRESSURE: 77 MMHG | HEART RATE: 89 BPM | WEIGHT: 238.5 LBS | RESPIRATION RATE: 15 BRPM | OXYGEN SATURATION: 99 % | HEIGHT: 64 IN

## 2020-07-06 DIAGNOSIS — Z09 ENCOUNTER FOR FOLLOW-UP EXAMINATION AFTER COMPLETED TREATMENT FOR CONDITIONS OTHER THAN MALIGNANT NEOPLASM: ICD-10-CM

## 2020-07-06 PROCEDURE — 99024 POSTOP FOLLOW-UP VISIT: CPT

## 2020-09-11 ENCOUNTER — APPOINTMENT (OUTPATIENT)
Dept: SURGERY | Facility: CLINIC | Age: 25
End: 2020-09-11
Payer: COMMERCIAL

## 2020-09-11 VITALS
HEART RATE: 70 BPM | BODY MASS INDEX: 37.05 KG/M2 | SYSTOLIC BLOOD PRESSURE: 117 MMHG | RESPIRATION RATE: 16 BRPM | OXYGEN SATURATION: 98 % | DIASTOLIC BLOOD PRESSURE: 80 MMHG | TEMPERATURE: 97.2 F | WEIGHT: 217 LBS | HEIGHT: 64 IN

## 2020-09-11 PROCEDURE — 99024 POSTOP FOLLOW-UP VISIT: CPT

## 2020-09-11 RX ORDER — MULTIVITAMIN
TABLET ORAL
Refills: 0 | Status: ACTIVE | COMMUNITY

## 2020-11-13 NOTE — ED PROVIDER NOTE - PRO INTERPRETER NEED 2
English currently chest pain free, EKG SR with 1st degree AVB, no ischemic changes. Cardiac enzymes negative x 1 set.  --will F/U cardiac enzymes@5AM.  --obtain Echocardiogram    **Recent diagnostic R+L heart cath@[email or web address] 1/9/19 revealed LMCA normal, LAD with minor luminal irregularities, LCx large and normal, RCA large and normal. PA 18, RV 73/13 (21), PA 70/34 (49), PCWP 26, CO 7.3 L/min, CI 3.7, TPG 23, severe AS (mean LV/Ao gradient 48.1 mmHg, SETH 1.1 cm2). likely in setting of HTN urgency, currently chest pain free. EKG SR with 1st degree AVB, no ischemic changes. Cardiac enzymes negative x 1 set.  --will F/U cardiac enzymes@5AM.  --obtain Echocardiogram    **Recent diagnostic R+L heart cath@[email or web address] 1/9/19 revealed LMCA normal, LAD with minor luminal irregularities, LCx large and normal, RCA large and normal. PA 18, RV 73/13 (21), PA 70/34 (49), PCWP 26, CO 7.3 L/min, CI 3.7, TPG 23, severe AS (mean LV/Ao gradient 48.1 mmHg, SETH 1.1 cm2).

## 2020-12-10 DIAGNOSIS — Z98.84 BARIATRIC SURGERY STATUS: ICD-10-CM

## 2020-12-11 ENCOUNTER — APPOINTMENT (OUTPATIENT)
Dept: SURGERY | Facility: CLINIC | Age: 25
End: 2020-12-11
Payer: COMMERCIAL

## 2020-12-11 PROCEDURE — 99214 OFFICE O/P EST MOD 30 MIN: CPT

## 2020-12-11 PROCEDURE — 99072 ADDL SUPL MATRL&STAF TM PHE: CPT

## 2020-12-30 LAB
25(OH)D3 SERPL-MCNC: 11.7 NG/ML
ALBUMIN SERPL ELPH-MCNC: 4.2 G/DL
ALP BLD-CCNC: 97 U/L
ALT SERPL-CCNC: 24 U/L
ANION GAP SERPL CALC-SCNC: 12 MMOL/L
AST SERPL-CCNC: 17 U/L
BASOPHILS # BLD AUTO: 0.04 K/UL
BASOPHILS NFR BLD AUTO: 0.7 %
BILIRUB SERPL-MCNC: 0.3 MG/DL
BUN SERPL-MCNC: 6 MG/DL
CALCIUM SERPL-MCNC: 9.8 MG/DL
CHLORIDE SERPL-SCNC: 103 MMOL/L
CO2 SERPL-SCNC: 24 MMOL/L
CREAT SERPL-MCNC: 0.9 MG/DL
EOSINOPHIL # BLD AUTO: 0.11 K/UL
EOSINOPHIL NFR BLD AUTO: 1.9 %
ESTIMATED AVERAGE GLUCOSE: 88 MG/DL
FOLATE SERPL-MCNC: 5.1 NG/ML
GLUCOSE SERPL-MCNC: 83 MG/DL
HBA1C MFR BLD HPLC: 4.7 %
HCT VFR BLD CALC: 41 %
HGB BLD-MCNC: 12.7 G/DL
IMM GRANULOCYTES NFR BLD AUTO: 0.2 %
IRON SERPL-MCNC: 64 UG/DL
LYMPHOCYTES # BLD AUTO: 2.82 K/UL
LYMPHOCYTES NFR BLD AUTO: 48.5 %
MAN DIFF?: NORMAL
MCHC RBC-ENTMCNC: 30.8 PG
MCHC RBC-ENTMCNC: 31 GM/DL
MCV RBC AUTO: 99.5 FL
MONOCYTES # BLD AUTO: 0.36 K/UL
MONOCYTES NFR BLD AUTO: 6.2 %
NEUTROPHILS # BLD AUTO: 2.48 K/UL
NEUTROPHILS NFR BLD AUTO: 42.5 %
PLATELET # BLD AUTO: 375 K/UL
POTASSIUM SERPL-SCNC: 3.9 MMOL/L
PROT SERPL-MCNC: 6.9 G/DL
RBC # BLD: 4.12 M/UL
RBC # FLD: 12.3 %
SODIUM SERPL-SCNC: 139 MMOL/L
VIT B12 SERPL-MCNC: 363 PG/ML
WBC # FLD AUTO: 5.82 K/UL

## 2021-02-27 NOTE — BRIEF OPERATIVE NOTE - OPERATION/FINDINGS
[facility] called for an update. Provided new set of vitals and additional meds given. Advised they still do not have a bed but will call when one is available.      Slava Mccall RN  02/27/21 0099 Intact stomach, normal anatomy

## 2021-03-22 ENCOUNTER — APPOINTMENT (OUTPATIENT)
Dept: SURGERY | Facility: CLINIC | Age: 26
End: 2021-03-22
Payer: COMMERCIAL

## 2021-03-22 VITALS
BODY MASS INDEX: 32.74 KG/M2 | HEIGHT: 64 IN | DIASTOLIC BLOOD PRESSURE: 78 MMHG | OXYGEN SATURATION: 100 % | HEART RATE: 64 BPM | TEMPERATURE: 97.7 F | SYSTOLIC BLOOD PRESSURE: 125 MMHG | WEIGHT: 191.8 LBS | RESPIRATION RATE: 16 BRPM

## 2021-03-22 DIAGNOSIS — E66.9 OBESITY, UNSPECIFIED: ICD-10-CM

## 2021-03-22 DIAGNOSIS — Z87.898 PERSONAL HISTORY OF OTHER SPECIFIED CONDITIONS: ICD-10-CM

## 2021-03-22 DIAGNOSIS — Z01.818 ENCOUNTER FOR OTHER PREPROCEDURAL EXAMINATION: ICD-10-CM

## 2021-03-22 PROCEDURE — 99214 OFFICE O/P EST MOD 30 MIN: CPT

## 2021-03-22 PROCEDURE — 99072 ADDL SUPL MATRL&STAF TM PHE: CPT

## 2021-07-19 ENCOUNTER — APPOINTMENT (OUTPATIENT)
Dept: SURGERY | Facility: CLINIC | Age: 26
End: 2021-07-19

## 2021-10-19 ENCOUNTER — EMERGENCY (EMERGENCY)
Facility: HOSPITAL | Age: 26
LOS: 0 days | Discharge: HOME | End: 2021-10-19
Payer: COMMERCIAL

## 2021-10-19 VITALS
SYSTOLIC BLOOD PRESSURE: 136 MMHG | OXYGEN SATURATION: 98 % | HEIGHT: 64 IN | RESPIRATION RATE: 17 BRPM | HEART RATE: 104 BPM | WEIGHT: 169.98 LBS | TEMPERATURE: 99 F | DIASTOLIC BLOOD PRESSURE: 86 MMHG

## 2021-10-19 DIAGNOSIS — G47.33 OBSTRUCTIVE SLEEP APNEA (ADULT) (PEDIATRIC): ICD-10-CM

## 2021-10-19 DIAGNOSIS — M54.2 CERVICALGIA: ICD-10-CM

## 2021-10-19 DIAGNOSIS — Z87.42 PERSONAL HISTORY OF OTHER DISEASES OF THE FEMALE GENITAL TRACT: ICD-10-CM
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
## 2021-10-19 DIAGNOSIS — Z88.0 ALLERGY STATUS TO PENICILLIN: ICD-10-CM

## 2021-10-19 DIAGNOSIS — E66.01 MORBID (SEVERE) OBESITY DUE TO EXCESS CALORIES: ICD-10-CM

## 2021-10-19 DIAGNOSIS — Z98.890 OTHER SPECIFIED POSTPROCEDURAL STATES: Chronic | ICD-10-CM
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
## 2021-10-19 PROCEDURE — 99284 EMERGENCY DEPT VISIT MOD MDM: CPT

## 2021-10-19 RX ORDER — KETOROLAC TROMETHAMINE 30 MG/ML
30 SYRINGE (ML) INJECTION ONCE
Refills: 0 | Status: DISCONTINUED | OUTPATIENT
Start: 2021-10-19 | End: 2021-10-19

## 2021-10-19 RX ORDER — ACETAMINOPHEN 500 MG
650 TABLET ORAL ONCE
Refills: 0 | Status: COMPLETED | OUTPATIENT
Start: 2021-10-19 | End: 2021-10-19

## 2021-10-19 RX ORDER — LIDOCAINE 4 G/100G
1 CREAM TOPICAL ONCE
Refills: 0 | Status: COMPLETED | OUTPATIENT
Start: 2021-10-19 | End: 2021-10-19

## 2021-10-19 RX ADMIN — LIDOCAINE 1 PATCH: 4 CREAM TOPICAL at 16:16

## 2021-10-19 RX ADMIN — Medication 30 MILLIGRAM(S): at 16:16

## 2021-10-19 RX ADMIN — Medication 650 MILLIGRAM(S): at 16:16
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
## 2021-10-19 NOTE — ED PROVIDER NOTE - NS ED ROS FT
Constitutional: (-) Fever, (-) Chills  Skin: (-) Color changes, (-) Rashes, (-) Wounds  Eyes: (-) Visual changes, (-) Discharge, (-) Redness  Ears: (-) Hearing loss, (-)Tinnitus, (-) Ear pain  CV: (-) Chest pain, (-) Palpitations  Resp: (-) Cough, (-) Shortness of breath  GI: (-) Abdominal pain, (-) Nausea, (-) Vomiting  : (-) Dysuria, (-) Hematuria, (-) Increased frequency, (-) Incontinence, (-) Retention, (-) Saddle anesthesia   MSK: (+) Neck pain, (-) Myalgias, (-) Back pain  Neuro: (-) Headache

## 2021-10-19 NOTE — ED PROVIDER NOTE - PATIENT PORTAL LINK FT
You can access the FollowMyHealth Patient Portal offered by [facility] by registering at the following website: [email or web address]. By joining [facility]’s FollowMyHealth portal, you will also be able to view your health information using other applications (apps) compatible with our system.
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
## 2022-04-14 ENCOUNTER — APPOINTMENT (OUTPATIENT)
Dept: ORTHOPEDIC SURGERY | Facility: CLINIC | Age: 27
End: 2022-04-14
Payer: COMMERCIAL

## 2022-04-14 VITALS — WEIGHT: 208 LBS | BODY MASS INDEX: 35.51 KG/M2 | HEIGHT: 64 IN

## 2022-04-14 DIAGNOSIS — M62.838 OTHER MUSCLE SPASM: ICD-10-CM

## 2022-04-14 DIAGNOSIS — M62.830 MUSCLE SPASM OF BACK: ICD-10-CM

## 2022-04-14 DIAGNOSIS — M54.12 RADICULOPATHY, CERVICAL REGION: ICD-10-CM

## 2022-04-14 DIAGNOSIS — S13.4XXD SPRAIN OF LIGAMENTS OF CERVICAL SPINE, SUBSEQUENT ENCOUNTER: ICD-10-CM

## 2022-04-14 PROCEDURE — 99214 OFFICE O/P EST MOD 30 MIN: CPT

## 2022-04-14 NOTE — REVIEW OF SYSTEMS
[Joint Pain] : joint pain [Joint Stiffness] : joint stiffness [Negative] : Heme/Lymph [FreeTextEntry5] : high cholesterol

## 2022-04-14 NOTE — ASSESSMENT
[FreeTextEntry1] : R C6/7 HNP, L C4/5 HNP and pain to R scapula with intermittent RUE radic\par PT, meds, NSAIDs at safe dosage\par L MRI w/ spasm and no HNPs\par Pain c/s-> had consultation, is holding off for now

## 2022-04-14 NOTE — HISTORY OF PRESENT ILLNESS
[5] : 5 [Dull/Aching] : dull/aching [Intermittent] : intermittent [Rest] : rest [Physical therapy] : physical therapy [Sitting] : sitting [Not working due to injury] : Work status: not working due to injury [de-identified] : [facility] 11/12/21-\par 1. Straightening of lordosis.\par 2. Multilevel bulging, facet hypertrophy, and ligamentum flavum hypertrophy with foraminal stenosis.\par [facility] 10/29/21-\par Findings: Straightening of the lordosis. Multilevel loss of disc signal at C4-C5 through C6-C7. No vertebral body fracture.\par No vertebral body subluxation.\par C1-C2: Arthrosis with capsular thickening with minimal effusion. No erosion or fracture.\par C2-C3: No herniation, foraminal stenosis, or central stenosis.\par C3-C4: No herniation, foraminal stenosis, or central stenosis.\par C4-C5: Broad bulge with asymmetric to left herniation impressing on the thecal sac with no contact of the cord or\par central stenosis. No foraminal stenosis.\par C5-C6: Broad bulge impressing on the thecal sac with no herniation, foraminal stenosis, or central stenosis.\par C6-C7: Broad bulge with asymmetric to right herniation impressing on the thecal sac with no contact of the cord or\par central stenosis. No foraminal stenosis.\par C7-T1: No herniation, foraminal stenosis, or central stenosis.\par Cord, no atrophy or enlargement. Craniocervical junction is normally positioned. Facets are aligned and ligaments are\par intact. Soft tissues are intact.\par Ind. review- R C6/7 HNP, L C4/5 HNP\par \par L MRI 11/12/21- \par Findings: Convex left curvature. Straightening of upper lordosis. No vertebral body compression fracture. No\par spondylolysis.\par T12-L1: No herniation, foraminal stenosis, or central stenosis.\par L1-L2: No herniation, foraminal stenosis, or central stenosis. Facet hypertrophy and ligamentum flavum\par hypertrophy.\par L2-L3: Broad bulge, facet hypertrophy, and ligamentum flavum hypertrophy with foraminal stenosis. No\par herniation or central stenosis.\par L3-L4: Broad bulge, facet hypertrophy, and ligamentum flavum hypertrophy with foraminal stenosis. No\par herniation or central stenosis.\par L4-L5: Broad bulge, facet hypertrophy, and ligamentum flavum hypertrophy with foraminal stenosis. No\par central herniation or central stenosis.\par L5-S1: Broad bulge and facet hypertrophy with no herniation, foraminal stenosis, or central stenosis.\par Ind. review- straight, no HNPs\par --------------------------------------------------\par RHDF. \par Midline neck pain with pain to the R scapula to posterior shoulder. \par No N/T. \par Also LBP up/down T/L spine musculature.\par 11/4/21- Still pain thru R scapula to posterior shoulder.\par 11/11/21- Still pain across R scapula. Now worsening of T/L pain and spasm\par 11/18/21- continued neck pain to R scapula and T/L spasm, in PT\par 12/16/21- continued neck pain with pain through R scapula to arm, forearm. LBP improved with PT\par 1/13/22- PT helping, still pain radiating through the R scapula, arm, forearm\par 2/10/22- PT somewhat helpful, pain still radiating through R scapula, arm, forearm\par 3/17/22-Pain remains the same. Pain mainly when in one position for too long. Still having trouble with heavy lifting.\par Continues to have pain radiating through right scapula, arm and forearm w/o N/T. PT has been improving strength, but\par not pain.\par 4/14/22- neck pain still down the RUE through scapula to arm when lifting. PT somewhat helpful\par  [] : no [FreeTextEntry1] : C/T-spine [FreeTextEntry5] : NOTES FROM 3/17/22- Pain remains the same. Pain mainly when in one position for too long. Still having trouble with heavy lifting.\par Continues to have pain radiating through right scapula, arm and forearm w/o N/T. PT has been improving strength, but\par not pain.\par \par 04/14/22: pain in right scapula only when lifting heavy items. Improvement since last visit. Will like to return to work. She will be working light duty. Reports constant pain in T-spine. Would like another PT script. [de-identified] : lifting items,  [de-identified] : physical therapy [de-identified] :

## 2022-04-14 NOTE — PHYSICAL EXAM
[de-identified] : Right Shoulder: Range of motion of the shoulder is as follows: There is good motion and There are no\par symptoms. Ligament Stability and Special Tests of the shoulder is as follows: Impingement testing produces\par scapular pain \par Left Shoulder: Range of motion of the shoulder is as follows: There is good motion and There are no symptoms. \par Neck: \par Inspection of the cervical spine is as follows: no ecchymosis. Palpation of the cervical spine is as follows: right\par trapezial tenderness and right rhomboid tenderness. Range of motion of the cervical spine is as\par follows: diminished range of motion in all planes Strength Testing for the cervical spine is as follows: Left Deltoid\par strength 5/5, Right Deltoid strength 5/5, Left Biceps 5/5, Right Biceps 5/5, Left Triceps 5/5, Right Triceps 5/5, Left Wrist\par Flexors 5/5, Right Wrist Flexors 5/5, Left Finger Abductors 5/5, Right Finger Abductors 5/5, Left Grasp 5/5 and Right\par Grasp 5/5 Neurological testing for the cervical spine is as follows: light touch is intact throughout both upper extremities\par and negative Dimas reflex

## 2024-07-11 ENCOUNTER — APPOINTMENT (OUTPATIENT)
Dept: ORTHOPEDIC SURGERY | Facility: CLINIC | Age: 29
End: 2024-07-11

## 2024-07-11 DIAGNOSIS — S33.5XXA SPRAIN OF LIGAMENTS OF LUMBAR SPINE, INITIAL ENCOUNTER: ICD-10-CM

## 2024-07-11 PROCEDURE — 72170 X-RAY EXAM OF PELVIS: CPT

## 2024-07-11 PROCEDURE — 72110 X-RAY EXAM L-2 SPINE 4/>VWS: CPT

## 2024-07-11 PROCEDURE — 99213 OFFICE O/P EST LOW 20 MIN: CPT | Mod: 25

## 2024-07-11 RX ORDER — CYCLOBENZAPRINE HYDROCHLORIDE 5 MG/1
5 TABLET, FILM COATED ORAL
Qty: 45 | Refills: 1 | Status: ACTIVE | COMMUNITY
Start: 2024-07-11 | End: 1900-01-01